# Patient Record
Sex: FEMALE | Race: WHITE | Employment: FULL TIME | URBAN - METROPOLITAN AREA
[De-identification: names, ages, dates, MRNs, and addresses within clinical notes are randomized per-mention and may not be internally consistent; named-entity substitution may affect disease eponyms.]

---

## 2017-09-26 ENCOUNTER — HOSPITAL ENCOUNTER (EMERGENCY)
Facility: HOSPITAL | Age: 42
Discharge: HOME | End: 2017-09-26
Payer: COMMERCIAL

## 2017-09-26 VITALS
SYSTOLIC BLOOD PRESSURE: 108 MMHG | BODY MASS INDEX: 27.6 KG/M2 | RESPIRATION RATE: 20 BRPM | WEIGHT: 150 LBS | DIASTOLIC BLOOD PRESSURE: 68 MMHG | TEMPERATURE: 99 F | HEIGHT: 62 IN | HEART RATE: 56 BPM | OXYGEN SATURATION: 97 %

## 2017-09-26 DIAGNOSIS — J30.9 ACUTE ALLERGIC RHINITIS, UNSPECIFIED SEASONALITY, UNSPECIFIED TRIGGER: Primary | ICD-10-CM

## 2017-09-26 DIAGNOSIS — L30.9 DERMATITIS: ICD-10-CM

## 2017-09-26 LAB — S PYO AG THROAT QL: NEGATIVE

## 2017-09-26 PROCEDURE — 99283 EMERGENCY DEPT VISIT LOW MDM: CPT

## 2017-09-26 PROCEDURE — 87430 STREP A AG IA: CPT

## 2017-09-26 NOTE — ED PROVIDER NOTES
Patient Seen in: [facility] Emergency Department    History   Patient presents with:  Rash Skin Problem (integumentary)    Stated Complaint: rash    HPI    38year old female, with a history of migraine headaches, presents to the emergency departm kg   LMP 09/25/2017   SpO2 97%   BMI 27.44 kg/m²         Physical Exam   Constitutional: She is oriented to person, place, and time. She appears well-developed and well-nourished. HENT:   Head: Normocephalic.    Right Ear: Tympanic membrane is injected (m at night with a topical hydrocortisone cream for the next 3 days if symptoms worsen she should follow-up with the primary care doctor or return to the ER    Disposition and Plan     Clinical Impression:  Acute allergic rhinitis, unspecified seasonality, un

## 2017-09-26 NOTE — ED INITIAL ASSESSMENT (HPI)
Pt states small red raised rash behind left ear, sore throat, and \"feeling of being tired\" since this AM. Denies fever.

## 2017-09-26 NOTE — ED NOTES
Patient reports having post-nasal drip, sore throat, swollen glands, and rash on neck since this morning. Bilateral tender and swollen gland palpated behind ears, under jaw and on neck.  Red, spotted rash noted to primarily the left side of neck and also to

## 2017-09-27 NOTE — ED NOTES
Patient is cleared for discharge per APN. Discharge instructions reviewed with patient including when and how to follow up with healthcare providers and when to seek emergency care.  Medications were reviewed for symptom management per APN request. Patient

## 2018-05-04 ENCOUNTER — TELEPHONE (OUTPATIENT)
Dept: OBGYN CLINIC | Facility: CLINIC | Age: 43
End: 2018-05-04

## 2018-05-04 NOTE — TELEPHONE ENCOUNTER
PER PT STATE SHE'S HAS A POSS CYST / ABD PAIN / CRAMPING / PT WOULD LIKE TO GET IN TO SEE DR / PLS ADV

## 2018-05-04 NOTE — TELEPHONE ENCOUNTER
Pt calling to report that she thinks she has another cyst and is in pain. Pt stated that she also has not had a period since the middle of February. Pt stated she has \"all the symptoms\" of her period but never bleeds. Pt stated that prior to February she would get monthly periods but they would be irregular and sometimes she should bleed every 2 weeks. Pt reports lower pelvic pain and back pain that she rates as 8/10. Pt stated she took 600mg ibuprofen at 7am and 600mg again around 10:45am and pt still rates pain as 8/10. Pt stated she has also tried heating pads. Pt informed that with pain that high it is recommended that she goes to the ER to be evaluated. Pt verbalized understanding. Pt instructed to call office tomorrow morning for a f/u.

## 2018-05-08 ENCOUNTER — OFFICE VISIT (OUTPATIENT)
Dept: OBGYN CLINIC | Facility: CLINIC | Age: 43
End: 2018-05-08

## 2018-05-08 ENCOUNTER — APPOINTMENT (OUTPATIENT)
Dept: LAB | Facility: HOSPITAL | Age: 43
End: 2018-05-08
Attending: OBSTETRICS & GYNECOLOGY
Payer: COMMERCIAL

## 2018-05-08 ENCOUNTER — TELEPHONE (OUTPATIENT)
Dept: OBGYN CLINIC | Facility: CLINIC | Age: 43
End: 2018-05-08

## 2018-05-08 VITALS
BODY MASS INDEX: 27 KG/M2 | HEART RATE: 59 BPM | WEIGHT: 150 LBS | DIASTOLIC BLOOD PRESSURE: 83 MMHG | SYSTOLIC BLOOD PRESSURE: 121 MMHG

## 2018-05-08 DIAGNOSIS — R10.2 PELVIC PAIN: Primary | ICD-10-CM

## 2018-05-08 DIAGNOSIS — R10.2 PELVIC PAIN: ICD-10-CM

## 2018-05-08 DIAGNOSIS — N73.0 PID (ACUTE PELVIC INFLAMMATORY DISEASE): ICD-10-CM

## 2018-05-08 PROCEDURE — 81001 URINALYSIS AUTO W/SCOPE: CPT

## 2018-05-08 PROCEDURE — 96372 THER/PROPH/DIAG INJ SC/IM: CPT | Performed by: OBSTETRICS & GYNECOLOGY

## 2018-05-08 PROCEDURE — 81025 URINE PREGNANCY TEST: CPT | Performed by: OBSTETRICS & GYNECOLOGY

## 2018-05-08 PROCEDURE — 99214 OFFICE O/P EST MOD 30 MIN: CPT | Performed by: OBSTETRICS & GYNECOLOGY

## 2018-05-08 RX ORDER — DOXYCYCLINE HYCLATE 100 MG
100 TABLET ORAL 2 TIMES DAILY
Qty: 28 TABLET | Refills: 0 | Status: SHIPPED | OUTPATIENT
Start: 2018-05-08 | End: 2018-05-22

## 2018-05-08 RX ORDER — CEFTRIAXONE SODIUM 250 MG/1
250 INJECTION, POWDER, FOR SOLUTION INTRAMUSCULAR; INTRAVENOUS ONCE
Status: COMPLETED | OUTPATIENT
Start: 2018-05-08 | End: 2018-05-08

## 2018-05-08 RX ADMIN — CEFTRIAXONE SODIUM 250 MG: 250 INJECTION, POWDER, FOR SOLUTION INTRAMUSCULAR; INTRAVENOUS at 14:20:00

## 2018-05-08 NOTE — H&P
HPI:  The patient is a 44-year-old female who presents complaining of irregular menses, pelvic pain, cramping and spotting. The patient states her LMP is 2/18/2018. For the last 2 weeks she has noted an increase discomfort in her lower abdomen.   She ha Pt has a pacemaker No    Pt has a defibrillator No    Reaction to local anesthetic No     Social History Narrative   None on file       FAMILY HISTORY:  Family History   Problem Relation Age of Onset   • Hypertension Mother    • Deonna Desouza Mother today for gyn exam.    Diagnoses and all orders for this visit:    Pelvic pain  -     URINALYSIS WITH CULTURE REFLEX; Future  -     URINE PREGNANCY TEST  -     GENITAL VAGINOSIS SCREEN; Future  -     CHLAMYDIA/GONOCOCCUS, MARY LOU;  Future  -     GENITAL VAGINOS

## 2018-05-08 NOTE — TELEPHONE ENCOUNTER
Pt was told to schedule a f/u with arnoldo. Pt scheduled for June 7 at 6:30. She as an appt currently on June 6 with dr. Angella Huff. Wanted to know if dr. Angella Huff could do her f/u or if she needs to see arnoldo.

## 2018-05-08 NOTE — PROGRESS NOTES
Rocephin 250 mg IM administered to pt's Left Upper Outer Gluteus. Pt tolerated well. Encouraged pt to call clinic with questions and concerns.

## 2018-05-10 ENCOUNTER — TELEPHONE (OUTPATIENT)
Dept: PEDIATRICS CLINIC | Facility: CLINIC | Age: 43
End: 2018-05-10

## 2018-05-10 RX ORDER — FLUCONAZOLE 150 MG/1
150 TABLET ORAL DAILY
Qty: 2 TABLET | Refills: 0 | Status: SHIPPED | OUTPATIENT
Start: 2018-05-10 | End: 2018-05-12

## 2018-05-10 NOTE — TELEPHONE ENCOUNTER
----- Message from Hyun Reza DO sent at 5/10/2018  2:53 PM CDT -----  Needs diflucan. Have her take at the end of finish her PID antibiotics.

## 2018-05-10 NOTE — TELEPHONE ENCOUNTER
States has not notice any improvement with treatment. Rates pain now 5/10. States before starting tx pain was 6/10.   States \"last night pain was so bad that almost went to [employer] States it woke her up from her sleep and was up from 1-4am.  Took 3 Aleve cap

## 2018-05-10 NOTE — TELEPHONE ENCOUNTER
Ref Range & Units 5/8/18  2:24 PM   Genital vaginosis screen Negative Negative    Trichomonas screen Negative Negative    Candida Screen Negative for Candida 2+ Candida albicans

## 2018-05-10 NOTE — TELEPHONE ENCOUNTER
Rx order reviewed with LISA while in the office. Per LISA pt to take Diflucan 150mg as per below and repeat second dose 72 hrs after the first one. Also ask pt how her pain is. Pt advised of result and recs per below and states understanding.   Call dropped

## 2018-05-11 NOTE — TELEPHONE ENCOUNTER
Need to give antibiotics some time to work, if by early next week still having pain, then we may need to get an US to ensure no masses
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
## 2018-05-22 ENCOUNTER — TELEPHONE (OUTPATIENT)
Dept: OBGYN CLINIC | Facility: CLINIC | Age: 43
End: 2018-05-22

## 2018-05-22 DIAGNOSIS — R10.2 PELVIC PAIN: Primary | ICD-10-CM

## 2018-05-22 NOTE — TELEPHONE ENCOUNTER
Per the pt she just finished an antibiotic prescribed by Dr Azar Speak, and is not felling any better. The would like to speak with a doctor or nurse about her symptoms. Please advise.

## 2018-05-23 NOTE — TELEPHONE ENCOUNTER
Pt reports today she completed antibiotics (doxycycline) prescribed by Iva Lua on 5/8/18 for PID and pt is still having pelvic pain 5-6/10.  Pt states she had some pain relief when she was taking Aleve 660 mg around the clock about four times daily then she dec

## 2018-05-24 ENCOUNTER — HOSPITAL ENCOUNTER (OUTPATIENT)
Dept: ULTRASOUND IMAGING | Facility: HOSPITAL | Age: 43
Discharge: HOME | End: 2018-05-24
Attending: OBSTETRICS & GYNECOLOGY
Payer: COMMERCIAL

## 2018-05-24 DIAGNOSIS — R10.2 PELVIC PAIN: ICD-10-CM

## 2018-05-24 PROCEDURE — 76830 TRANSVAGINAL US NON-OB: CPT | Performed by: OBSTETRICS & GYNECOLOGY

## 2018-05-24 PROCEDURE — 76856 US EXAM PELVIC COMPLETE: CPT | Performed by: OBSTETRICS & GYNECOLOGY

## 2018-05-25 ENCOUNTER — TELEPHONE (OUTPATIENT)
Dept: OBGYN CLINIC | Facility: CLINIC | Age: 43
End: 2018-05-25

## 2018-05-25 ENCOUNTER — OFFICE VISIT (OUTPATIENT)
Dept: FAMILY MEDICINE CLINIC | Facility: CLINIC | Age: 43
End: 2018-05-25

## 2018-05-25 VITALS
BODY MASS INDEX: 25.44 KG/M2 | OXYGEN SATURATION: 98 % | DIASTOLIC BLOOD PRESSURE: 76 MMHG | SYSTOLIC BLOOD PRESSURE: 100 MMHG | WEIGHT: 149 LBS | HEART RATE: 64 BPM | HEIGHT: 64 IN

## 2018-05-25 DIAGNOSIS — N83.202 CYST OF LEFT OVARY: Primary | ICD-10-CM

## 2018-05-25 DIAGNOSIS — B35.3 TINEA PEDIS OF BOTH FEET: ICD-10-CM

## 2018-05-25 DIAGNOSIS — G89.29 CHRONIC RLQ PAIN: Primary | ICD-10-CM

## 2018-05-25 DIAGNOSIS — R10.31 CHRONIC RLQ PAIN: Primary | ICD-10-CM

## 2018-05-25 PROCEDURE — 80053 COMPREHEN METABOLIC PANEL: CPT | Performed by: FAMILY MEDICINE

## 2018-05-25 PROCEDURE — 99203 OFFICE O/P NEW LOW 30 MIN: CPT | Performed by: FAMILY MEDICINE

## 2018-05-25 PROCEDURE — 85027 COMPLETE CBC AUTOMATED: CPT | Performed by: FAMILY MEDICINE

## 2018-05-25 RX ORDER — HYDROCODONE BITARTRATE AND ACETAMINOPHEN 5; 325 MG/1; MG/1
1 TABLET ORAL EVERY 12 HOURS PRN
Qty: 20 TABLET | Refills: 0 | Status: SHIPPED | OUTPATIENT
Start: 2018-05-25 | End: 2018-07-21

## 2018-05-25 NOTE — TELEPHONE ENCOUNTER
Reviewed pelvic US. Patient with 4 cm cyst on her left ovary and a small amount of free fluid in the pelvis. The cyst isn't big enough to be the source of her pain.   If her pain is still significant, she may benefit from seeing her PCP to ensure no concer

## 2018-05-25 NOTE — PROGRESS NOTES
HPI:    Patient ID: Arjun Marie is a 41year old female.     For past 4 weeks having RLQ>LLQ pain that radiates to lower back and occasionally to right leg   Taking ibuprofen 600mg ATC  Gradually worsening  Hard to walk  Having nausea  No vomiting, BM santana Oropharynx is clear and moist and mucous membranes are normal.   Eyes: Conjunctivae are normal. Pupils are equal, round, and reactive to light. Neck: Neck supple. Cardiovascular: Normal rate, regular rhythm and normal heart sounds. No murmur heard.

## 2018-05-25 NOTE — TELEPHONE ENCOUNTER
Informed pt that Iva Lua stated pt with 4 cm cyst on her left ovary and a small amount of free fluid in the pelvis. Informed pt that cyst is not big enough to be the source of her pain.  Informed her that Iva Lua stated if her pain is still significant, she may elvira

## 2018-05-31 ENCOUNTER — HOSPITAL ENCOUNTER (OUTPATIENT)
Dept: CT IMAGING | Facility: HOSPITAL | Age: 43
Discharge: HOME | End: 2018-05-31
Attending: FAMILY MEDICINE
Payer: COMMERCIAL

## 2018-05-31 DIAGNOSIS — R10.31 CHRONIC RLQ PAIN: ICD-10-CM

## 2018-05-31 DIAGNOSIS — G89.29 CHRONIC RLQ PAIN: ICD-10-CM

## 2018-05-31 PROCEDURE — 74177 CT ABD & PELVIS W/CONTRAST: CPT | Performed by: FAMILY MEDICINE

## 2018-06-04 ENCOUNTER — TELEPHONE (OUTPATIENT)
Dept: FAMILY MEDICINE CLINIC | Facility: CLINIC | Age: 43
End: 2018-06-04

## 2018-06-06 ENCOUNTER — OFFICE VISIT (OUTPATIENT)
Dept: OBGYN CLINIC | Facility: CLINIC | Age: 43
End: 2018-06-06

## 2018-06-06 ENCOUNTER — TELEPHONE (OUTPATIENT)
Dept: FAMILY MEDICINE CLINIC | Facility: CLINIC | Age: 43
End: 2018-06-06

## 2018-06-06 VITALS
WEIGHT: 150 LBS | SYSTOLIC BLOOD PRESSURE: 117 MMHG | BODY MASS INDEX: 26 KG/M2 | DIASTOLIC BLOOD PRESSURE: 81 MMHG | HEART RATE: 64 BPM

## 2018-06-06 DIAGNOSIS — N94.6 MENSES PAINFUL: ICD-10-CM

## 2018-06-06 DIAGNOSIS — Z01.419 ENCOUNTER FOR GYNECOLOGICAL EXAMINATION WITHOUT ABNORMAL FINDING: Primary | ICD-10-CM

## 2018-06-06 DIAGNOSIS — Z12.4 SCREENING FOR MALIGNANT NEOPLASM OF CERVIX: ICD-10-CM

## 2018-06-06 DIAGNOSIS — R10.2 PELVIC PAIN IN FEMALE: ICD-10-CM

## 2018-06-06 PROCEDURE — 99396 PREV VISIT EST AGE 40-64: CPT | Performed by: OBSTETRICS & GYNECOLOGY

## 2018-06-06 PROCEDURE — 99213 OFFICE O/P EST LOW 20 MIN: CPT | Performed by: OBSTETRICS & GYNECOLOGY

## 2018-06-06 RX ORDER — ACETAMINOPHEN AND CODEINE PHOSPHATE 120; 12 MG/5ML; MG/5ML
0.35 SOLUTION ORAL DAILY
Qty: 3 PACKAGE | Refills: 3 | Status: SHIPPED | OUTPATIENT
Start: 2018-06-06 | End: 2018-12-12

## 2018-06-06 NOTE — TELEPHONE ENCOUNTER
Patient received ct results from Dr. Ji Lewis and was told to follow up with ob gyn. Patient went to go see her ob but states she told her something different and is currently confused as to what she has or needs to do next.  She wants to speak to Dr. Kami Parnell

## 2018-06-06 NOTE — PROGRESS NOTES
Carly Meyers is a 41year old female [id number] Patient's last menstrual period was 05/28/2018. Patient presents with:  Gyn Exam: ANNUAL  She c/o right sided pelvic pain. She is seeing Dr. Markell Valdes for this.   Patient states that she had no menses from [facility]       GYNE HISTORY:   Menarche: 8years old  Period Cycle (Days): Irregular- Not getting period since 6/2016  Use of Birth Control (if yes, specify type): Vasectomy  Pap Date: 08/05/13  Pap Result Notes: Pap-Negative, Hpv-Negative, [facility] 8/18/14 0    ALLERGIES:    Latex                   RASH      Review of Systems:  Constitutional:  Denies fatigue, night sweats, hot flashes  Eyes:  denies blurred or double vision  Cardiovascular:  denies chest pain or palpitations  Respiratory:  denies shortness Normal without tenderness on motion  Uterus: normal in size, contour, position, mobility, without tenderness  Adnexa: normal without masses or tenderness  Perineum: normal  Anus: no hemorroids     Assessment & Plan:    Encounter for gynecological examinati CT chest: Marked narrowing at the confluence of the right subclavian and right internal jugular veins by an enlarged multinodular goiter with resultant subcutaneous edema in the right chest wall and right arm and formation of anterior chest wall collaterals. Bilateral pulmonary nodules measuring up to 4 mm. Age-indeterminate compression fracture deformities of the L2 and L5 vertebral bodies.

## 2018-06-21 NOTE — PROGRESS NOTES
Neg pap, HPV neg, repeat pap needed in 3 years, return to clinic 1 year for annual exam,Letter Sent via mail.

## 2018-07-05 ENCOUNTER — HOSPITAL ENCOUNTER (OUTPATIENT)
Dept: ULTRASOUND IMAGING | Facility: HOSPITAL | Age: 43
Discharge: HOME | End: 2018-07-05
Attending: OBSTETRICS & GYNECOLOGY
Payer: COMMERCIAL

## 2018-07-05 DIAGNOSIS — N83.202 CYST OF LEFT OVARY: ICD-10-CM

## 2018-07-05 PROCEDURE — 76830 TRANSVAGINAL US NON-OB: CPT | Performed by: OBSTETRICS & GYNECOLOGY

## 2018-07-05 PROCEDURE — 76856 US EXAM PELVIC COMPLETE: CPT | Performed by: OBSTETRICS & GYNECOLOGY

## 2018-07-06 ENCOUNTER — TELEPHONE (OUTPATIENT)
Dept: FAMILY MEDICINE CLINIC | Facility: CLINIC | Age: 43
End: 2018-07-06

## 2018-07-21 ENCOUNTER — OFFICE VISIT (OUTPATIENT)
Dept: FAMILY MEDICINE CLINIC | Facility: CLINIC | Age: 43
End: 2018-07-21
Payer: COMMERCIAL

## 2018-07-21 VITALS
WEIGHT: 155 LBS | HEART RATE: 58 BPM | OXYGEN SATURATION: 98 % | BODY MASS INDEX: 27 KG/M2 | SYSTOLIC BLOOD PRESSURE: 110 MMHG | DIASTOLIC BLOOD PRESSURE: 70 MMHG

## 2018-07-21 DIAGNOSIS — R29.898 WEAKNESS OF RIGHT LOWER EXTREMITY: ICD-10-CM

## 2018-07-21 DIAGNOSIS — G89.29 CHRONIC RLQ PAIN: Primary | ICD-10-CM

## 2018-07-21 DIAGNOSIS — R10.31 CHRONIC RLQ PAIN: Primary | ICD-10-CM

## 2018-07-21 DIAGNOSIS — M54.16 LUMBAR RADICULOPATHY, CHRONIC: ICD-10-CM

## 2018-07-21 PROCEDURE — 99213 OFFICE O/P EST LOW 20 MIN: CPT | Performed by: FAMILY MEDICINE

## 2018-07-21 RX ORDER — HYDROCODONE BITARTRATE AND ACETAMINOPHEN 5; 325 MG/1; MG/1
1 TABLET ORAL EVERY 12 HOURS PRN
Qty: 30 TABLET | Refills: 0 | Status: SHIPPED | OUTPATIENT
Start: 2018-07-21 | End: 2018-12-12

## 2018-07-26 ENCOUNTER — HOSPITAL ENCOUNTER (OUTPATIENT)
Dept: MRI IMAGING | Facility: HOSPITAL | Age: 43
Discharge: HOME | End: 2018-07-26
Attending: PHYSICIAN ASSISTANT
Payer: COMMERCIAL

## 2018-07-26 DIAGNOSIS — R10.2 PELVIC PAIN: ICD-10-CM

## 2018-07-26 PROCEDURE — 72197 MRI PELVIS W/O & W/DYE: CPT | Performed by: PHYSICIAN ASSISTANT

## 2018-07-26 PROCEDURE — A9576 INJ PROHANCE MULTIPACK: HCPCS | Performed by: RADIOLOGY

## 2018-07-26 NOTE — PROGRESS NOTES
HPI:    Patient ID: Carly Meyers is a 41year old female. For past 2-3mo has continue to have RLQ pain that radiates to lower back and down right leg  w/ numbness and tingling.  Has also noticed increased bruising in right thigh  Taking ibuprofen 600mg A affected area once daily Disp: 15 g Rfl: 1     Allergies:  Latex                   RASH   PHYSICAL EXAM:   Physical Exam   Vitals reviewed. Constitutional: She appears well-developed and well-nourished. No distress.    HENT:   Head: Normocephalic and atra orders of the defined types were placed in this encounter. Meds This Visit:  Signed Prescriptions Disp Refills    HYDROcodone-acetaminophen 5-325 MG Oral Tab 30 tablet 0      Sig: Take 1 tablet by mouth every 12 (twelve) hours as needed for Pain.

## 2018-07-27 ENCOUNTER — HOSPITAL ENCOUNTER (OUTPATIENT)
Dept: MRI IMAGING | Facility: HOSPITAL | Age: 43
Discharge: HOME | End: 2018-07-27
Attending: FAMILY MEDICINE
Payer: COMMERCIAL

## 2018-07-27 DIAGNOSIS — R29.898 WEAKNESS OF RIGHT LOWER EXTREMITY: ICD-10-CM

## 2018-07-27 DIAGNOSIS — M54.16 LUMBAR RADICULOPATHY, CHRONIC: ICD-10-CM

## 2018-07-27 PROCEDURE — 72148 MRI LUMBAR SPINE W/O DYE: CPT | Performed by: FAMILY MEDICINE

## 2018-08-01 ENCOUNTER — OFFICE VISIT (OUTPATIENT)
Dept: FAMILY MEDICINE CLINIC | Facility: CLINIC | Age: 43
End: 2018-08-01
Payer: COMMERCIAL

## 2018-08-01 VITALS
BODY MASS INDEX: 27 KG/M2 | SYSTOLIC BLOOD PRESSURE: 102 MMHG | DIASTOLIC BLOOD PRESSURE: 70 MMHG | OXYGEN SATURATION: 98 % | WEIGHT: 158 LBS | HEART RATE: 62 BPM

## 2018-08-01 DIAGNOSIS — M51.26 L4-L5 DISC BULGE: ICD-10-CM

## 2018-08-01 DIAGNOSIS — G89.29 CHRONIC RLQ PAIN: ICD-10-CM

## 2018-08-01 DIAGNOSIS — M47.816 OSTEOARTHRITIS OF LUMBAR SPINE, UNSPECIFIED SPINAL OSTEOARTHRITIS COMPLICATION STATUS: ICD-10-CM

## 2018-08-01 DIAGNOSIS — M54.16 LUMBAR RADICULOPATHY, CHRONIC: Primary | ICD-10-CM

## 2018-08-01 DIAGNOSIS — R10.31 CHRONIC RLQ PAIN: ICD-10-CM

## 2018-08-01 DIAGNOSIS — R10.2 PELVIC PAIN IN FEMALE: ICD-10-CM

## 2018-08-01 PROCEDURE — 99213 OFFICE O/P EST LOW 20 MIN: CPT | Performed by: FAMILY MEDICINE

## 2018-08-04 PROBLEM — R10.2 PELVIC PAIN IN FEMALE: Status: ACTIVE | Noted: 2018-08-04

## 2018-08-04 PROBLEM — M51.369 L4-L5 DISC BULGE: Status: ACTIVE | Noted: 2018-08-04

## 2018-08-04 PROBLEM — M51.36 L4-L5 DISC BULGE: Status: ACTIVE | Noted: 2018-08-04

## 2018-08-04 PROBLEM — M51.26 L4-L5 DISC BULGE: Status: ACTIVE | Noted: 2018-08-04

## 2018-08-04 NOTE — PROGRESS NOTES
HPI:    Patient ID: Norma Koehler is a 41year old female. For past 3-4mo having Right pelvic pain/RLQ pain that radiates to lower back and down right leg w/ numbness and tingling.    Taking ibuprofen 600mg ATC and Norco PRN  No nausea, vomiting, BM veronica Allergies:  Latex                   RASH   PHYSICAL EXAM:   Physical Exam   Vitals reviewed. Constitutional: She appears well-developed and well-nourished. No distress. HENT:   Head: Normocephalic and atraumatic.    Mouth/Throat: Oropharynx is clear physical in 3mo  Reasurrance and education provided. All questions answered. Red flags/ ER precautions discussed. No orders of the defined types were placed in this encounter.       Meds This Visit:  No prescriptions requested or ordered in this encounte

## 2018-08-06 ENCOUNTER — OFFICE VISIT (OUTPATIENT)
Dept: PAIN CLINIC | Facility: HOSPITAL | Age: 43
End: 2018-08-06
Attending: FAMILY MEDICINE
Payer: COMMERCIAL

## 2018-08-06 ENCOUNTER — DOCUMENTATION ONLY (OUTPATIENT)
Dept: PAIN CLINIC | Facility: HOSPITAL | Age: 43
End: 2018-08-06

## 2018-08-06 VITALS
HEIGHT: 64 IN | SYSTOLIC BLOOD PRESSURE: 100 MMHG | DIASTOLIC BLOOD PRESSURE: 70 MMHG | RESPIRATION RATE: 18 BRPM | BODY MASS INDEX: 26.98 KG/M2 | WEIGHT: 158 LBS

## 2018-08-06 DIAGNOSIS — M79.10 MYALGIA: Chronic | ICD-10-CM

## 2018-08-06 DIAGNOSIS — R10.31 CHRONIC RLQ PAIN: ICD-10-CM

## 2018-08-06 DIAGNOSIS — G89.29 CHRONIC RLQ PAIN: ICD-10-CM

## 2018-08-06 DIAGNOSIS — M54.16 LUMBAR RADICULOPATHY, CHRONIC: Primary | ICD-10-CM

## 2018-08-06 DIAGNOSIS — R29.898 WEAKNESS OF RIGHT LOWER EXTREMITY: ICD-10-CM

## 2018-08-06 DIAGNOSIS — M51.26 L4-L5 DISC BULGE: ICD-10-CM

## 2018-08-06 PROCEDURE — 99203 OFFICE O/P NEW LOW 30 MIN: CPT

## 2018-08-06 RX ORDER — METHYLPREDNISOLONE ACETATE 40 MG/ML
40 INJECTION, SUSPENSION INTRA-ARTICULAR; INTRALESIONAL; INTRAMUSCULAR; SOFT TISSUE ONCE
Status: COMPLETED | OUTPATIENT
Start: 2018-08-06 | End: 2018-08-06

## 2018-08-06 RX ORDER — BUPIVACAINE HYDROCHLORIDE 2.5 MG/ML
10 INJECTION, SOLUTION EPIDURAL; INFILTRATION; INTRACAUDAL ONCE
Status: COMPLETED | OUTPATIENT
Start: 2018-08-06 | End: 2018-08-06

## 2018-08-06 RX ADMIN — METHYLPREDNISOLONE ACETATE 40 MG: 40 INJECTION, SUSPENSION INTRA-ARTICULAR; INTRALESIONAL; INTRAMUSCULAR; SOFT TISSUE at 10:06:00

## 2018-08-06 RX ADMIN — BUPIVACAINE HYDROCHLORIDE 10 ML: 2.5 INJECTION, SOLUTION EPIDURAL; INFILTRATION; INTRACAUDAL at 10:05:00

## 2018-08-06 NOTE — CHRONIC PAIN
[facility]  Pain Clinic   New Consult       Patient name: Vince Callahan 41year old female  : 1975  MRN: [id number]  Referring MD: Gustavo Ayon    COMPLAINT:  Referred to the pain clinic by Dr. Reji Mcdaniel for low amy Outpatient Prescriptions:   •  HYDROcodone-acetaminophen 5-325 MG Oral Tab, Take 1 tablet by mouth every 12 (twelve) hours as needed for Pain., Disp: 30 tablet, Rfl: 0  •  Norethindrone (MANJIT-BE) 0.35 MG Oral Tab, Take 1 tablet (0.35 mg total) by mouth star Normal  Left Lower Extremity: Normal  SLR: Negative  SIJ tenderness: Positive on the right  Kristen's test: Negative bilaterally  TPs:  Present Right buttock area    IMAGING:  LUMBAR DISC LEVELS:  L1-L2:   There is suggestion of minimal, early facet arthrosi with alcohol swabs. 2 trigger points were injected into right gluteus ralph area. Total 10 cc of 0.5% bupivacaine with the 40 mg of Depo-Medrol injected. Tolerated procedure well discharge instructions given.   Comprehensive analgesic plan was formulat

## 2018-08-07 ENCOUNTER — DOCUMENTATION ONLY (OUTPATIENT)
Dept: PAIN CLINIC | Facility: HOSPITAL | Age: 43
End: 2018-08-07

## 2018-09-05 ENCOUNTER — OFFICE VISIT (OUTPATIENT)
Dept: PAIN CLINIC | Facility: HOSPITAL | Age: 43
End: 2018-09-05
Attending: ANESTHESIOLOGY
Payer: COMMERCIAL

## 2018-09-05 VITALS
WEIGHT: 158 LBS | HEIGHT: 64 IN | HEART RATE: 64 BPM | DIASTOLIC BLOOD PRESSURE: 67 MMHG | RESPIRATION RATE: 18 BRPM | BODY MASS INDEX: 26.98 KG/M2 | SYSTOLIC BLOOD PRESSURE: 108 MMHG

## 2018-09-05 DIAGNOSIS — M54.16 LUMBAR RADICULOPATHY, CHRONIC: Primary | ICD-10-CM

## 2018-09-05 PROCEDURE — 99211 OFF/OP EST MAY X REQ PHY/QHP: CPT

## 2018-09-05 NOTE — PROGRESS NOTES
INITIAL CONSULT:  08/06/18  PRESENTS AMBULATORY TO [facility]; ACCOMPANIED BY HER ;  NEW CONSULT C/O OF RT PELVIC PAIN RADIATING DOWN THE RLE; RATES 4/10;  NC STATES THE PAIN BEGAN IN FEB.  AND HAS INCREASED OVERTIME;  \"IT FEEL LIKE SOME ONE IS SQUEEZING M

## 2018-09-05 NOTE — CHRONIC PAIN
Follow-up Note    HISTORY OF PRESENT ILLNESS:  Alton Mera is a 41year old old female, returns to the clinic for evaluation treatment of her low back pain which radiates onto her right buttock and and thigh Naomiолег Romero had a epidural injection right L4-5 bird SINCE FEB/ 2018       SURGICAL HISTORY:  Past Surgical History:  No date: LAPAROSCOPY,DIAGNOSTIC    FAMILY HISTORY:  Family History   Problem Relation Age of Onset   • Hypertension Mother    • Lipids Mother      hyperlipidemia   • Other Drucella Tae Mother Sensation (light touch/pinprick/temperature):   Right Lower Extremity:  Normal  Left Lower Extremity:  Normal  Right Upper Extremity: Normal  Left Upper Extremity: Normal  Edema - Absent    SLR: negative   SIJ tenderness:  negative     TPs: Present:

## 2018-09-10 ENCOUNTER — DOCUMENTATION ONLY (OUTPATIENT)
Dept: PAIN CLINIC | Facility: HOSPITAL | Age: 43
End: 2018-09-10

## 2018-10-05 ENCOUNTER — OFFICE VISIT (OUTPATIENT)
Dept: PAIN CLINIC | Facility: HOSPITAL | Age: 43
End: 2018-10-05
Attending: ANESTHESIOLOGY
Payer: COMMERCIAL

## 2018-10-05 DIAGNOSIS — M51.26 L4-L5 DISC BULGE: Primary | ICD-10-CM

## 2018-10-05 PROCEDURE — 99211 OFF/OP EST MAY X REQ PHY/QHP: CPT

## 2018-10-05 NOTE — CHRONIC PAIN
Follow-up Note  CC: S/P Right  Trans KAIN 9/21/18  HISTORY OF PRESENT ILLNESS:  Rock Moon is a 41year old old female, originally referred to the pain clinic by Dr. Amarilis Dominguez, with history of L4-L5 disc bulge  (primary encounter diagnosis) returns to the female     L4-L5 disc bulge     Myalgia    Past Medical History:   Diagnosis Date   • Exposure to genital herpes    • Migraine    • Pelvic pain in female     SINCE FEB/ 2018       SURGICAL HISTORY:  Past Surgical History:  No date: LAPAROSCOPY,DIAGNOSTIC Bike Helmet: Not Asked        Seat Belt: Not Asked        Self-Exams: Not Asked        Grew up on a farm: Not Asked        History of tanning: Not Asked        Outdoor occupation: Not Asked        Pt has a pacemaker: No        Pt has a defibrillator: N length including pharmacotherapy (eg. Anti- inflammatories, muscle relaxants, neuropathic medications, oral steroids, analgesics), injections, and further testing.  Risks and benefits of all options were discussed at length to patients satisfaction during a

## 2018-10-05 NOTE — PROGRESS NOTES
Patient presents to [facility] ambulatory for follow up right trans epidural steroid injection done 9-21. She reports 70% relief and is happy with the results. She has not needed to take norco since the injection.   She continues to have numbness and tingling in

## 2018-12-12 ENCOUNTER — OFFICE VISIT (OUTPATIENT)
Dept: FAMILY MEDICINE CLINIC | Facility: CLINIC | Age: 43
End: 2018-12-12
Payer: COMMERCIAL

## 2018-12-12 VITALS
OXYGEN SATURATION: 99 % | HEIGHT: 64 IN | HEART RATE: 75 BPM | BODY MASS INDEX: 26.8 KG/M2 | DIASTOLIC BLOOD PRESSURE: 84 MMHG | WEIGHT: 157 LBS | SYSTOLIC BLOOD PRESSURE: 138 MMHG

## 2018-12-12 DIAGNOSIS — R10.2 PELVIC PAIN IN FEMALE: Primary | ICD-10-CM

## 2018-12-12 DIAGNOSIS — G89.29 CHRONIC RLQ PAIN: ICD-10-CM

## 2018-12-12 DIAGNOSIS — M54.16 LUMBAR RADICULOPATHY, CHRONIC: ICD-10-CM

## 2018-12-12 DIAGNOSIS — M51.26 L4-L5 DISC BULGE: ICD-10-CM

## 2018-12-12 DIAGNOSIS — R10.31 CHRONIC RLQ PAIN: ICD-10-CM

## 2018-12-12 PROCEDURE — 99214 OFFICE O/P EST MOD 30 MIN: CPT | Performed by: FAMILY MEDICINE

## 2018-12-12 PROCEDURE — 96372 THER/PROPH/DIAG INJ SC/IM: CPT | Performed by: FAMILY MEDICINE

## 2018-12-12 RX ORDER — KETOROLAC TROMETHAMINE 30 MG/ML
30 INJECTION, SOLUTION INTRAMUSCULAR; INTRAVENOUS ONCE
Status: COMPLETED | OUTPATIENT
Start: 2018-12-12 | End: 2018-12-12

## 2018-12-12 RX ORDER — NAPROXEN 500 MG/1
500 TABLET ORAL 2 TIMES DAILY WITH MEALS
COMMUNITY
End: 2019-07-31

## 2018-12-12 RX ORDER — HYDROCODONE BITARTRATE AND ACETAMINOPHEN 10; 325 MG/1; MG/1
1 TABLET ORAL EVERY 12 HOURS PRN
Qty: 30 TABLET | Refills: 0 | Status: SHIPPED | OUTPATIENT
Start: 2018-12-12 | End: 2019-07-24

## 2018-12-12 RX ADMIN — KETOROLAC TROMETHAMINE 30 MG: 30 INJECTION, SOLUTION INTRAMUSCULAR; INTRAVENOUS at 19:25:00

## 2018-12-13 NOTE — PROGRESS NOTES
HPI:   Patient presents with:  Pelvic Pain: c/c pelvic pain x2 weeks       Ebb Sheryl is a 41year old female presenting for:  Pain  -for past 6mo having Right pelvic pain/RLQ pain that radiates to lower back and down right leg w/ numbness and tfngling Authorizing Provider:  Lizbeth Linn MD        Collected:           06/06/2018 09:31 AM          Ordering Location:     [facility] for Received:            06/06/2018 05:53 PM                                 Health, [address], No     Family History:  Family History   Problem Relation Age of Onset   • Hypertension Mother    • Lipids Mother         hyperlipidemia   • Other (Other) Mother    • Cancer Cousin         leukemia   • Cancer Paternal Uncle         unsure   • Stroke Matern in office  -continue gyne and pain f/u.   -if no improvement may need nerve block   - PHYSICAL THERAPY - INTERNAL      Follow-up in 1mo  Patient indicates understanding of the above recommendations and agrees to the above plan.   Reasurrance and education p

## 2018-12-14 PROBLEM — R10.31 CHRONIC RLQ PAIN: Status: ACTIVE | Noted: 2018-12-14

## 2018-12-14 PROBLEM — G89.29 CHRONIC RLQ PAIN: Status: ACTIVE | Noted: 2018-12-14

## 2018-12-14 PROBLEM — M54.16 LUMBAR RADICULOPATHY, CHRONIC: Status: ACTIVE | Noted: 2018-12-14

## 2019-02-22 ENCOUNTER — OFFICE VISIT (OUTPATIENT)
Dept: FAMILY MEDICINE CLINIC | Facility: CLINIC | Age: 44
End: 2019-02-22
Payer: COMMERCIAL

## 2019-02-22 VITALS
RESPIRATION RATE: 14 BRPM | SYSTOLIC BLOOD PRESSURE: 126 MMHG | OXYGEN SATURATION: 98 % | HEIGHT: 64 IN | TEMPERATURE: 98 F | HEART RATE: 66 BPM | DIASTOLIC BLOOD PRESSURE: 84 MMHG | WEIGHT: 160 LBS | BODY MASS INDEX: 27.31 KG/M2

## 2019-02-22 DIAGNOSIS — H57.13 PAIN OF BOTH EYES: ICD-10-CM

## 2019-02-22 DIAGNOSIS — H57.89 REDNESS OF BOTH EYES: ICD-10-CM

## 2019-02-22 DIAGNOSIS — H53.143 PHOTOPHOBIA OF BOTH EYES: Primary | ICD-10-CM

## 2019-02-22 PROCEDURE — 99213 OFFICE O/P EST LOW 20 MIN: CPT | Performed by: FAMILY MEDICINE

## 2019-02-22 NOTE — PROGRESS NOTES
HPI:   Patient presents with:  Eye Pain: c/c redness and pain on both eyes since yesterday      Judi Saucedo is a 45year old female presenting for:  Eye discomfort  -yesterday developed redness in b/l eyes along with eye pain and discomfort w/ eye movem Health, [address] [place] [address]                                                                     First Screen:          Gera Pizarro • Cancer Paternal Uncle         unsure   • Stroke Maternal Grandmother    • Stroke Maternal Grandfather    • No Known Problems Father    • No Known Problems Daughter    • No Known Problems Son    • Obesity Sister    • Other (Other) Brother         divirt keratitis based on sx and exam  -discussed w/ opthalmologist clinic here at [place] ([address] at [facility]).  Office will be able to see her right now with the ophthalmologist  -pt sent to optho clinic immediately      Follow-up PRN  Patient indicates Teressa

## 2019-05-09 ENCOUNTER — TELEPHONE (OUTPATIENT)
Dept: OBGYN CLINIC | Facility: CLINIC | Age: 44
End: 2019-05-09

## 2019-05-09 ENCOUNTER — APPOINTMENT (OUTPATIENT)
Dept: LAB | Facility: HOSPITAL | Age: 44
End: 2019-05-09
Attending: OBSTETRICS & GYNECOLOGY
Payer: COMMERCIAL

## 2019-05-09 DIAGNOSIS — R35.0 URINARY FREQUENCY: ICD-10-CM

## 2019-05-09 DIAGNOSIS — R30.9 PAINFUL URINATION: ICD-10-CM

## 2019-05-09 DIAGNOSIS — R39.15 URGENCY OF URINATION: ICD-10-CM

## 2019-05-09 DIAGNOSIS — R10.9 ABDOMINAL PRESSURE: Primary | ICD-10-CM

## 2019-05-09 DIAGNOSIS — R10.9 ABDOMINAL PRESSURE: ICD-10-CM

## 2019-05-09 PROCEDURE — 87086 URINE CULTURE/COLONY COUNT: CPT

## 2019-05-09 PROCEDURE — 81003 URINALYSIS AUTO W/O SCOPE: CPT

## 2019-05-09 NOTE — TELEPHONE ENCOUNTER
C/O LOW ABDOMINAL ACHE, PAIN AT THE END OF URINARY STREAM, URINARY URGENCY, SOMETIMES SMALL AMOUNTS AND FREQUENCY. ADVISED TO COME FOR UA. ORDER PLACED. ASKED HER TO CALL IN THE MORNING FOR THE RESULT.

## 2019-05-10 NOTE — TELEPHONE ENCOUNTER
UA not c/w a UTI, I would refer her to urology- Dr Doreen Mata- to determine if there is a noninfectious cause for her bladder symptoms.   PLease ask of possible to run a urine culture on her sample since there are a few bacteria but no other indicators of a UTI

## 2019-05-14 NOTE — TELEPHONE ENCOUNTER
Informed pt that the urine culture shows no growth at 18-24 hours. Informed pt that CAP wanted pt to see Dr. Franca Haile to see if there is a noninfectious cause for bladder symptoms.       Pt wanted CAP to know that she was taking a new vitamin \"Hum\" and sto

## 2019-05-15 NOTE — TELEPHONE ENCOUNTER
PT NOTIFIED OF RECS AND VERBALIZED UNDERSTANDING. OFFERED NAMES AND PHONE NUMBERS OF DR. Jayesh AQUINO.

## 2019-05-17 ENCOUNTER — TELEPHONE (OUTPATIENT)
Dept: OBGYN CLINIC | Facility: CLINIC | Age: 44
End: 2019-05-17

## 2019-05-17 NOTE — TELEPHONE ENCOUNTER
----- Message from Jonathan Castro MD sent at 5/17/2019  8:02 AM CDT -----  Urine culture is negative

## 2019-07-24 ENCOUNTER — OFFICE VISIT (OUTPATIENT)
Dept: FAMILY MEDICINE CLINIC | Facility: CLINIC | Age: 44
End: 2019-07-24
Payer: COMMERCIAL

## 2019-07-24 ENCOUNTER — APPOINTMENT (OUTPATIENT)
Dept: CT IMAGING | Facility: HOSPITAL | Age: 44
End: 2019-07-24
Attending: EMERGENCY MEDICINE
Payer: COMMERCIAL

## 2019-07-24 ENCOUNTER — HOSPITAL ENCOUNTER (EMERGENCY)
Facility: HOSPITAL | Age: 44
Discharge: HOME | End: 2019-07-24
Attending: EMERGENCY MEDICINE
Payer: COMMERCIAL

## 2019-07-24 VITALS
OXYGEN SATURATION: 100 % | HEART RATE: 68 BPM | HEIGHT: 64 IN | SYSTOLIC BLOOD PRESSURE: 126 MMHG | BODY MASS INDEX: 26.29 KG/M2 | DIASTOLIC BLOOD PRESSURE: 82 MMHG | WEIGHT: 154 LBS

## 2019-07-24 VITALS
OXYGEN SATURATION: 98 % | RESPIRATION RATE: 16 BRPM | TEMPERATURE: 99 F | HEART RATE: 52 BPM | SYSTOLIC BLOOD PRESSURE: 118 MMHG | DIASTOLIC BLOOD PRESSURE: 76 MMHG | BODY MASS INDEX: 26 KG/M2 | WEIGHT: 154 LBS

## 2019-07-24 DIAGNOSIS — M54.50 ACUTE RIGHT-SIDED LOW BACK PAIN WITHOUT SCIATICA: ICD-10-CM

## 2019-07-24 DIAGNOSIS — S16.1XXA STRAIN OF NECK MUSCLE, INITIAL ENCOUNTER: ICD-10-CM

## 2019-07-24 DIAGNOSIS — T07.XXXA MULTIPLE CONTUSIONS: Primary | ICD-10-CM
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
## 2019-07-24 DIAGNOSIS — R07.89 OTHER CHEST PAIN: ICD-10-CM

## 2019-07-24 DIAGNOSIS — S63.642A SPRAIN OF METACARPOPHALANGEAL (MCP) JOINT OF LEFT THUMB, INITIAL ENCOUNTER: ICD-10-CM

## 2019-07-24 DIAGNOSIS — S06.0X0A CONCUSSION WITHOUT LOSS OF CONSCIOUSNESS, INITIAL ENCOUNTER: ICD-10-CM

## 2019-07-24 DIAGNOSIS — S13.4XXA NECK PAIN WITH NECK STIFFNESS AFTER WHIPLASH INJURY TO NECK: ICD-10-CM

## 2019-07-24 DIAGNOSIS — R52 PAIN OF RIGHT SIDE OF BODY: ICD-10-CM

## 2019-07-24 LAB
B-HCG UR QL: NEGATIVE
BILIRUB UR QL: NEGATIVE
CLARITY UR: CLEAR
COLOR UR: YELLOW
GLUCOSE UR-MCNC: NEGATIVE MG/DL
HGB UR QL STRIP.AUTO: NEGATIVE
KETONES UR-MCNC: NEGATIVE MG/DL
LEUKOCYTE ESTERASE UR QL STRIP.AUTO: NEGATIVE
NITRITE UR QL STRIP.AUTO: NEGATIVE
PH UR: 5 [PH] (ref 5–8)
PROT UR-MCNC: NEGATIVE MG/DL
RBC #/AREA URNS AUTO: 5 /HPF
SP GR UR STRIP: 1.03 (ref 1–1.03)
UROBILINOGEN UR STRIP-ACNC: <2
VIT C UR-MCNC: NEGATIVE MG/DL
WBC #/AREA URNS AUTO: 2 /HPF

## 2019-07-24 PROCEDURE — 99284 EMERGENCY DEPT VISIT MOD MDM: CPT

## 2019-07-24 PROCEDURE — 81003 URINALYSIS AUTO W/O SCOPE: CPT | Performed by: EMERGENCY MEDICINE

## 2019-07-24 PROCEDURE — 72125 CT NECK SPINE W/O DYE: CPT | Performed by: EMERGENCY MEDICINE

## 2019-07-24 PROCEDURE — 81025 URINE PREGNANCY TEST: CPT

## 2019-07-24 PROCEDURE — 70450 CT HEAD/BRAIN W/O DYE: CPT | Performed by: EMERGENCY MEDICINE

## 2019-07-24 PROCEDURE — 99214 OFFICE O/P EST MOD 30 MIN: CPT | Performed by: FAMILY MEDICINE

## 2019-07-24 RX ORDER — HYDROCODONE BITARTRATE AND ACETAMINOPHEN 5; 325 MG/1; MG/1
2 TABLET ORAL ONCE
Status: COMPLETED | OUTPATIENT
Start: 2019-07-24 | End: 2019-07-24

## 2019-07-24 RX ORDER — HYDROCODONE BITARTRATE AND ACETAMINOPHEN 5; 325 MG/1; MG/1
1-2 TABLET ORAL EVERY 6 HOURS PRN
Qty: 10 TABLET | Refills: 0 | Status: SHIPPED | OUTPATIENT
Start: 2019-07-24 | End: 2019-07-31

## 2019-07-24 RX ORDER — TRAMADOL HYDROCHLORIDE 50 MG/1
50 TABLET ORAL EVERY 12 HOURS PRN
Qty: 20 TABLET | Refills: 0 | Status: SHIPPED | OUTPATIENT
Start: 2019-07-24 | End: 2019-07-31

## 2019-07-24 RX ORDER — PREDNISONE 20 MG/1
40 TABLET ORAL DAILY
Qty: 10 TABLET | Refills: 0 | Status: SHIPPED | OUTPATIENT
Start: 2019-07-24 | End: 2019-07-29

## 2019-07-24 RX ORDER — CYCLOBENZAPRINE HCL 5 MG
5 TABLET ORAL 3 TIMES DAILY PRN
Qty: 30 TABLET | Refills: 0 | Status: SHIPPED | OUTPATIENT
Start: 2019-07-24 | End: 2019-07-31

## 2019-07-24 RX ORDER — NAPROXEN 500 MG/1
500 TABLET ORAL 2 TIMES DAILY WITH MEALS
Qty: 30 TABLET | Refills: 0 | Status: SHIPPED | OUTPATIENT
Start: 2019-07-24 | End: 2019-07-31
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
## 2019-07-31 ENCOUNTER — OFFICE VISIT (OUTPATIENT)
Dept: FAMILY MEDICINE CLINIC | Facility: CLINIC | Age: 44
End: 2019-07-31
Payer: COMMERCIAL

## 2019-07-31 ENCOUNTER — OFFICE VISIT (OUTPATIENT)
Dept: OBGYN CLINIC | Facility: CLINIC | Age: 44
End: 2019-07-31
Payer: COMMERCIAL

## 2019-07-31 VITALS
DIASTOLIC BLOOD PRESSURE: 81 MMHG | WEIGHT: 153 LBS | HEART RATE: 62 BPM | HEIGHT: 63.4 IN | BODY MASS INDEX: 26.77 KG/M2 | SYSTOLIC BLOOD PRESSURE: 120 MMHG

## 2019-07-31 VITALS
WEIGHT: 155 LBS | DIASTOLIC BLOOD PRESSURE: 64 MMHG | SYSTOLIC BLOOD PRESSURE: 110 MMHG | BODY MASS INDEX: 27.12 KG/M2 | HEIGHT: 63.4 IN | OXYGEN SATURATION: 100 % | HEART RATE: 70 BPM

## 2019-07-31 DIAGNOSIS — S06.0X0A CONCUSSION WITHOUT LOSS OF CONSCIOUSNESS, INITIAL ENCOUNTER: ICD-10-CM

## 2019-07-31 DIAGNOSIS — S13.4XXA NECK PAIN WITH NECK STIFFNESS AFTER WHIPLASH INJURY TO NECK: ICD-10-CM

## 2019-07-31 DIAGNOSIS — F41.1 GAD (GENERALIZED ANXIETY DISORDER): ICD-10-CM
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
## 2019-07-31 DIAGNOSIS — Z01.419 ENCOUNTER FOR GYNECOLOGICAL EXAMINATION WITHOUT ABNORMAL FINDING: Primary | ICD-10-CM

## 2019-07-31 DIAGNOSIS — Z12.31 VISIT FOR SCREENING MAMMOGRAM: ICD-10-CM

## 2019-07-31 DIAGNOSIS — M54.50 ACUTE RIGHT-SIDED LOW BACK PAIN WITHOUT SCIATICA: ICD-10-CM

## 2019-07-31 PROCEDURE — 99214 OFFICE O/P EST MOD 30 MIN: CPT | Performed by: FAMILY MEDICINE

## 2019-07-31 PROCEDURE — 99396 PREV VISIT EST AGE 40-64: CPT | Performed by: OBSTETRICS & GYNECOLOGY

## 2019-07-31 RX ORDER — PREDNISONE 20 MG/1
40 TABLET ORAL DAILY
Qty: 10 TABLET | Refills: 0 | Status: SHIPPED | OUTPATIENT
Start: 2019-07-31 | End: 2019-08-05

## 2019-07-31 RX ORDER — ALPRAZOLAM 0.25 MG/1
0.25 TABLET ORAL DAILY PRN
Qty: 20 TABLET | Refills: 0 | Status: SHIPPED | OUTPATIENT
Start: 2019-07-31 | End: 2021-02-11

## 2019-07-31 RX ORDER — TRAMADOL HYDROCHLORIDE 50 MG/1
50 TABLET ORAL NIGHTLY PRN
Qty: 20 TABLET | Refills: 0 | Status: SHIPPED | OUTPATIENT
Start: 2019-07-31 | End: 2020-01-21

## 2019-07-31 RX ORDER — NAPROXEN 500 MG/1
500 TABLET ORAL 2 TIMES DAILY PRN
Qty: 60 TABLET | Refills: 0 | Status: SHIPPED | OUTPATIENT
Start: 2019-07-31 | End: 2019-09-06

## 2019-07-31 RX ORDER — HYDROCODONE BITARTRATE AND ACETAMINOPHEN 5; 325 MG/1; MG/1
1 TABLET ORAL DAILY PRN
Qty: 20 TABLET | Refills: 0 | Status: SHIPPED | OUTPATIENT
Start: 2019-07-31 | End: 2020-01-21

## 2019-07-31 RX ORDER — CYCLOBENZAPRINE HCL 5 MG
5 TABLET ORAL NIGHTLY PRN
Qty: 30 TABLET | Refills: 0 | Status: SHIPPED | OUTPATIENT
Start: 2019-07-31

## 2019-07-31 RX ORDER — ESCITALOPRAM OXALATE 10 MG/1
10 TABLET ORAL DAILY
Qty: 90 TABLET | Refills: 0 | Status: SHIPPED | OUTPATIENT
Start: 2019-07-31 | End: 2021-02-11
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
## 2019-08-02 PROBLEM — F41.1 GAD (GENERALIZED ANXIETY DISORDER): Status: ACTIVE | Noted: 2019-08-02
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
## 2019-08-27 ENCOUNTER — HOSPITAL ENCOUNTER (OUTPATIENT)
Dept: MAMMOGRAPHY | Facility: HOSPITAL | Age: 44
Discharge: HOME | End: 2019-08-27
Attending: OBSTETRICS & GYNECOLOGY
Payer: COMMERCIAL

## 2019-08-27 DIAGNOSIS — Z12.31 VISIT FOR SCREENING MAMMOGRAM: ICD-10-CM

## 2019-08-27 PROCEDURE — 77063 BREAST TOMOSYNTHESIS BI: CPT | Performed by: OBSTETRICS & GYNECOLOGY

## 2019-08-27 PROCEDURE — 77067 SCR MAMMO BI INCL CAD: CPT | Performed by: OBSTETRICS & GYNECOLOGY

## 2019-09-06 DIAGNOSIS — M54.50 ACUTE RIGHT-SIDED LOW BACK PAIN WITHOUT SCIATICA: ICD-10-CM

## 2019-09-06 RX ORDER — NAPROXEN 500 MG/1
TABLET ORAL
Qty: 60 TABLET | Refills: 0 | Status: SHIPPED | OUTPATIENT
Start: 2019-09-06 | End: 2019-10-08

## 2019-10-08 DIAGNOSIS — M54.50 ACUTE RIGHT-SIDED LOW BACK PAIN WITHOUT SCIATICA: ICD-10-CM

## 2019-10-08 RX ORDER — NAPROXEN 500 MG/1
TABLET ORAL
Qty: 60 TABLET | Refills: 0 | Status: SHIPPED | OUTPATIENT
Start: 2019-10-08 | End: 2021-02-11

## 2019-10-31 DIAGNOSIS — F41.1 GAD (GENERALIZED ANXIETY DISORDER): ICD-10-CM

## 2019-10-31 RX ORDER — ESCITALOPRAM OXALATE 10 MG/1
TABLET ORAL
Qty: 90 TABLET | Refills: 0 | OUTPATIENT
Start: 2019-10-31

## 2020-01-15 ENCOUNTER — TELEPHONE (OUTPATIENT)
Dept: OBGYN CLINIC | Facility: CLINIC | Age: 45
End: 2020-01-15

## 2020-01-15 ENCOUNTER — HOSPITAL ENCOUNTER (EMERGENCY)
Facility: HOSPITAL | Age: 45
Discharge: HOME | End: 2020-01-15
Payer: COMMERCIAL

## 2020-01-15 VITALS
HEIGHT: 63 IN | BODY MASS INDEX: 28.35 KG/M2 | RESPIRATION RATE: 18 BRPM | SYSTOLIC BLOOD PRESSURE: 110 MMHG | DIASTOLIC BLOOD PRESSURE: 69 MMHG | TEMPERATURE: 98 F | HEART RATE: 55 BPM | WEIGHT: 160 LBS | OXYGEN SATURATION: 97 %

## 2020-01-15 DIAGNOSIS — N94.6 DYSMENORRHEA: Primary | ICD-10-CM

## 2020-01-15 LAB
ANION GAP SERPL CALC-SCNC: 3 MMOL/L (ref 0–18)
B-HCG UR QL: NEGATIVE
BASOPHILS # BLD AUTO: 0.03 X10(3) UL (ref 0–0.2)
BASOPHILS NFR BLD AUTO: 0.7 %
BILIRUB UR QL: NEGATIVE
BUN BLD-MCNC: 16 MG/DL (ref 7–18)
BUN/CREAT SERPL: 17 (ref 10–20)
CALCIUM BLD-MCNC: 8.6 MG/DL (ref 8.5–10.1)
CHLORIDE SERPL-SCNC: 108 MMOL/L (ref 98–112)
CLARITY UR: CLEAR
CO2 SERPL-SCNC: 29 MMOL/L (ref 21–32)
COLOR UR: YELLOW
CREAT BLD-MCNC: 0.94 MG/DL (ref 0.55–1.02)
DEPRECATED RDW RBC AUTO: 45.1 FL (ref 35.1–46.3)
EOSINOPHIL # BLD AUTO: 0.09 X10(3) UL (ref 0–0.7)
EOSINOPHIL NFR BLD AUTO: 2.2 %
ERYTHROCYTE [DISTWIDTH] IN BLOOD BY AUTOMATED COUNT: 12.5 % (ref 11–15)
GLUCOSE BLD-MCNC: 77 MG/DL (ref 70–99)
GLUCOSE UR-MCNC: NEGATIVE MG/DL
HCT VFR BLD AUTO: 38.2 % (ref 35–48)
HGB BLD-MCNC: 12.5 G/DL (ref 12–16)
IMM GRANULOCYTES # BLD AUTO: 0.02 X10(3) UL (ref 0–1)
IMM GRANULOCYTES NFR BLD: 0.5 %
KETONES UR-MCNC: NEGATIVE MG/DL
LEUKOCYTE ESTERASE UR QL STRIP.AUTO: NEGATIVE
LYMPHOCYTES # BLD AUTO: 2.13 X10(3) UL (ref 1–4)
LYMPHOCYTES NFR BLD AUTO: 51.8 %
MCH RBC QN AUTO: 32 PG (ref 26–34)
MCHC RBC AUTO-ENTMCNC: 32.7 G/DL (ref 31–37)
MCV RBC AUTO: 97.7 FL (ref 80–100)
MONOCYTES # BLD AUTO: 0.31 X10(3) UL (ref 0.1–1)
MONOCYTES NFR BLD AUTO: 7.5 %
NEUTROPHILS # BLD AUTO: 1.53 X10 (3) UL (ref 1.5–7.7)
NEUTROPHILS # BLD AUTO: 1.53 X10(3) UL (ref 1.5–7.7)
NEUTROPHILS NFR BLD AUTO: 37.3 %
NITRITE UR QL STRIP.AUTO: NEGATIVE
OSMOLALITY SERPL CALC.SUM OF ELEC: 290 MOSM/KG (ref 275–295)
PH UR: 7 [PH] (ref 5–8)
PLATELET # BLD AUTO: 289 10(3)UL (ref 150–450)
POTASSIUM SERPL-SCNC: 4.2 MMOL/L (ref 3.5–5.1)
PROT UR-MCNC: NEGATIVE MG/DL
RBC # BLD AUTO: 3.91 X10(6)UL (ref 3.8–5.3)
RBC #/AREA URNS AUTO: 5 /HPF
SODIUM SERPL-SCNC: 140 MMOL/L (ref 136–145)
SP GR UR STRIP: 1.02 (ref 1–1.03)
TROPONIN I SERPL-MCNC: <0.045 NG/ML (ref ?–0.04)
UROBILINOGEN UR STRIP-ACNC: <2
WBC # BLD AUTO: 4.1 X10(3) UL (ref 4–11)
WBC #/AREA URNS AUTO: <1 /HPF

## 2020-01-15 PROCEDURE — 81025 URINE PREGNANCY TEST: CPT

## 2020-01-15 PROCEDURE — 81001 URINALYSIS AUTO W/SCOPE: CPT

## 2020-01-15 PROCEDURE — 80048 BASIC METABOLIC PNL TOTAL CA: CPT

## 2020-01-15 PROCEDURE — 84484 ASSAY OF TROPONIN QUANT: CPT | Performed by: NURSE PRACTITIONER

## 2020-01-15 PROCEDURE — 85025 COMPLETE CBC W/AUTO DIFF WBC: CPT

## 2020-01-15 PROCEDURE — 93005 ELECTROCARDIOGRAM TRACING: CPT

## 2020-01-15 PROCEDURE — 36415 COLL VENOUS BLD VENIPUNCTURE: CPT

## 2020-01-15 PROCEDURE — 93010 ELECTROCARDIOGRAM REPORT: CPT | Performed by: NURSE PRACTITIONER

## 2020-01-15 PROCEDURE — 99285 EMERGENCY DEPT VISIT HI MDM: CPT

## 2020-01-15 RX ORDER — MEDROXYPROGESTERONE ACETATE 10 MG/1
10 TABLET ORAL DAILY
Qty: 10 TABLET | Refills: 0 | Status: SHIPPED | OUTPATIENT
Start: 2020-01-15 | End: 2020-01-25

## 2020-01-15 NOTE — ED PROVIDER NOTES
Patient Seen in: [facility] AND [facility] Emergency Department    History   CC: vaginal bleeding  HPI: Norma Koehler 39year old female  who presents to the ER c/o menstrual cycle which has present for the last 1 month.   Patient states when it first began she wa vital signs reviewed. All other systems reviewed and negative except as noted above. PSFH elements reviewed from today and agreed except as otherwise stated in HPI.     Medications :   medroxyPROGESTERone Acetate (PROVERA) 10 MG Oral Tab,  Take 1 ta steady gait  MSK - makes purposeful movements of all extremities, radial pulses 2+ bilat.   Psych - Interactive and appropriate      ED Course     Labs Reviewed   URINALYSIS WITH CULTURE REFLEX - Abnormal; Notable for the following components:       Result Nereida Walker MD  [facility] [address]  [place] [address]  [phone]          Lavon Rios MD  [address] [place], [place] [address] [address]  [phone]    Schedule an appointment as soon as possible for a visit in 2 days        [address] [place] [address]

## 2020-01-15 NOTE — TELEPHONE ENCOUNTER
Pt is having bleeding for 7 days with clots .  Pt said every day its getting worse ,  Needs to speak to office ,

## 2020-01-15 NOTE — ED INITIAL ASSESSMENT (HPI)
Patient states she is heavily bleeding x10 days. States she has been bleeding for 4 weeks. Lower right side abd pain. Hx of cyst.    Patient states she feels weak.

## 2020-01-15 NOTE — TELEPHONE ENCOUNTER
C/O BLEEDING FOR 4 WEEKS. H/O IRREGULAR CYCLES BUT NON HAVE LASTED THIS LONG.  BLEEDING HAS GOTTEN REALLY HEAVY IN THE PAST COUPLE DAYS AND IS SATURATING A PAD/HOUR OR LESS IN THE PAST 24 HOURS AND PROBABLY Q 2 HOURS FOR AT LEAST THE WEEK BEFORE THIS.   IS

## 2020-01-20 ENCOUNTER — TELEPHONE (OUTPATIENT)
Dept: FAMILY MEDICINE CLINIC | Facility: CLINIC | Age: 45
End: 2020-01-20

## 2020-01-20 NOTE — TELEPHONE ENCOUNTER
Per Dr Brooklynn Jennings no need to repeat EKG as of now, if she develops any kind of symptoms and would like to have a repeat EKG to have her schedule an appointment to discuss further treatment options, patient was advised on what to do and is agreeable to the pl

## 2020-01-20 NOTE — TELEPHONE ENCOUNTER
Patient is calling requesting an appointment for f/u from e.r due to abnormal ekg. Please advice. There is nothing available until 02/20 patient does not want to wait till then.

## 2020-01-21 ENCOUNTER — OFFICE VISIT (OUTPATIENT)
Dept: OBGYN CLINIC | Facility: CLINIC | Age: 45
End: 2020-01-21
Payer: COMMERCIAL

## 2020-01-21 VITALS
DIASTOLIC BLOOD PRESSURE: 80 MMHG | HEART RATE: 66 BPM | SYSTOLIC BLOOD PRESSURE: 119 MMHG | WEIGHT: 162 LBS | BODY MASS INDEX: 29 KG/M2

## 2020-01-21 DIAGNOSIS — N92.1 MENORRHAGIA WITH IRREGULAR CYCLE: Primary | ICD-10-CM

## 2020-01-21 DIAGNOSIS — N92.0 EXCESSIVE OR FREQUENT MENSTRUATION: ICD-10-CM

## 2020-01-21 PROCEDURE — 58100 BIOPSY OF UTERUS LINING: CPT | Performed by: OBSTETRICS & GYNECOLOGY

## 2020-01-21 NOTE — PROGRESS NOTES
Jose Celis    1975       Patient presents with:   Follow - Up: er follow up for vaginal bleeding also on going pain also need refill on ALPRAZOLAM  pt states that she did not have menses in November and a normal menses in December but it did not • Exposure to genital herpes    • Migraine    • Pelvic pain in female     SINCE FEB/ 2018       Past Surgical History:   Procedure Laterality Date   • LAPAROSCOPY,DIAGNOSTIC          Menarche: 8years old  Use of Birth Control (if yes, specify type):  Solomon Gramajo for follow - up. Diagnoses and all orders for this visit:    Menorrhagia with irregular cycle  -     US PELVIS W EV (CPT=76856/52624);  Future      15 minutes spent in this discussion, see separate note for EMB

## 2020-02-03 ENCOUNTER — HOSPITAL ENCOUNTER (OUTPATIENT)
Dept: ULTRASOUND IMAGING | Facility: HOSPITAL | Age: 45
Discharge: HOME | End: 2020-02-03
Attending: OBSTETRICS & GYNECOLOGY
Payer: COMMERCIAL

## 2020-02-03 DIAGNOSIS — N92.1 MENORRHAGIA WITH IRREGULAR CYCLE: ICD-10-CM

## 2020-02-03 PROCEDURE — 76856 US EXAM PELVIC COMPLETE: CPT | Performed by: OBSTETRICS & GYNECOLOGY

## 2020-02-03 PROCEDURE — 76830 TRANSVAGINAL US NON-OB: CPT | Performed by: OBSTETRICS & GYNECOLOGY

## 2020-02-04 DIAGNOSIS — M54.50 ACUTE RIGHT-SIDED LOW BACK PAIN WITHOUT SCIATICA: ICD-10-CM

## 2020-02-04 DIAGNOSIS — F41.1 GAD (GENERALIZED ANXIETY DISORDER): ICD-10-CM

## 2020-02-05 RX ORDER — NAPROXEN 500 MG/1
TABLET ORAL
Qty: 60 TABLET | Refills: 0 | OUTPATIENT
Start: 2020-02-05

## 2020-02-05 RX ORDER — ALPRAZOLAM 0.25 MG/1
TABLET ORAL
Qty: 20 TABLET | Refills: 0 | OUTPATIENT
Start: 2020-02-05

## 2020-02-06 ENCOUNTER — TELEPHONE (OUTPATIENT)
Dept: OBGYN CLINIC | Facility: CLINIC | Age: 45
End: 2020-02-06

## 2020-02-06 NOTE — TELEPHONE ENCOUNTER
----- Message from Meagan Cheema MD sent at 1/24/2020  1:51 PM CST -----  Endometrial biopsy negative, call if problem persists or recurs, call pt

## 2020-02-06 NOTE — TELEPHONE ENCOUNTER
Pt informed of CAPs recs and verbalized understanding. Pt stated she took 10 days of the provera that was prescribed by CAP and got a period a week later. Pt stated that her periods are still very heavy even after taking provera.  Pt stated her LMP was 2/2

## 2020-02-13 NOTE — TELEPHONE ENCOUNTER
I do advise the mirena IUD, please precert if necessary and if pt is ready to proceed then please instruct her to call on the first day of her next menses to schedule insertion

## 2020-02-13 NOTE — TELEPHONE ENCOUNTER
Pt notified of CAP recs and agrees to call back on day 1 of her next cycle. (Pt has PPO insurance and PA not required).

## 2020-06-04 ENCOUNTER — TELEPHONE (OUTPATIENT)
Dept: FAMILY MEDICINE CLINIC | Facility: CLINIC | Age: 45
End: 2020-06-04

## 2020-06-04 NOTE — TELEPHONE ENCOUNTER
Patient calling to get an appt with Dr. Manuela Herman for lower abdominal pain. Started Monday. She believes it might be bladder infection. She reports urinating more frequently with pain upon urination.   She has taken Aleve the day before, and yesterday she \

## 2020-10-28 ENCOUNTER — TELEMEDICINE (OUTPATIENT)
Dept: FAMILY MEDICINE CLINIC | Facility: CLINIC | Age: 45
End: 2020-10-28
Payer: COMMERCIAL

## 2020-10-28 DIAGNOSIS — R51.9 NEW ONSET OF HEADACHES: Primary | ICD-10-CM

## 2020-10-28 DIAGNOSIS — R53.83 OTHER FATIGUE: ICD-10-CM

## 2020-10-28 PROCEDURE — 99213 OFFICE O/P EST LOW 20 MIN: CPT | Performed by: INTERNAL MEDICINE

## 2020-10-29 ENCOUNTER — APPOINTMENT (OUTPATIENT)
Dept: LAB | Facility: HOSPITAL | Age: 45
End: 2020-10-29
Attending: INTERNAL MEDICINE
Payer: COMMERCIAL

## 2020-10-29 DIAGNOSIS — R53.83 OTHER FATIGUE: ICD-10-CM

## 2020-10-29 DIAGNOSIS — R51.9 NEW ONSET OF HEADACHES: ICD-10-CM

## 2020-11-05 ENCOUNTER — TELEPHONE (OUTPATIENT)
Dept: FAMILY MEDICINE CLINIC | Facility: CLINIC | Age: 45
End: 2020-11-05

## 2020-11-05 NOTE — TELEPHONE ENCOUNTER
Patient was already aware of the results and reports to be doing well.    ----- Message from Tatianna Culver MD sent at 11/2/2020  2:40 PM CST -----  Please inform that Covid 19 PCR was negative.   Thank you

## 2020-12-08 NOTE — PROGRESS NOTES
[facility]  Virtual Telephone Note      HPI:     Lisa Heath verbally consents to a Virtual/Telephone Check-In visit on 10/27/2020        Patient understands and accepts financial responsibility for any deductible, co-insurance and/ soft tissue measuring in aggregate 2.9 x 2.5 x 0.4 cm. The specimen is submitted entirely in cassette A1.  (jq)     Josette Bill M.D./nigel      Interpretation Benign         Labs:   CMP:  Lab Results   Component Value Date/Time     01/15/2020 12:13 P Relation Age of Onset   • Hypertension Mother    • Lipids Mother         hyperlipidemia   • Other (Other) Mother    • Cancer Cousin         leukemia   • Cancer Paternal Uncle         unsure   • Stroke Maternal Grandmother    • Stroke Maternal Grandfather Encounters:  01/21/20 : 162 lb (73.5 kg)  01/15/20 : 160 lb (72.6 kg)  07/31/19 : 155 lb (70.3 kg)      Physical Exam    PE: Appears AxOx3, no increased work of breathing, speaking in full sentences        ASSESSMENT AND PLAN:   Patient is a 39year old fe

## 2021-01-06 ENCOUNTER — TELEPHONE (OUTPATIENT)
Dept: OBGYN CLINIC | Facility: CLINIC | Age: 46
End: 2021-01-06

## 2021-01-06 NOTE — TELEPHONE ENCOUNTER
Pt reports rregular bleeding since last week of October 2020. Pt states she had heavy bleeding for about 2.5 wks and then bleeding turned into spotting and is currently spotting. Pt reports RLQ pain \"for years. \" Pt states the pain has increased since Oct

## 2021-01-06 NOTE — TELEPHONE ENCOUNTER
Annual scheduled for 3/4/21. Patient would like to be seen sooner because she has had her menstrual cycle since October. She is also experiencing increased pelvic pain. Would like to speak to a nurse to see if Dr. Hortencia Gross can see her sooner.   This has been

## 2021-01-11 NOTE — TELEPHONE ENCOUNTER
Latesha Chu for 1/27 appt but please advise pt that we may need to do an EMB at that visit so take motrin or ibuprofen 600mg po with food and water 30 min before appt.

## 2021-01-15 ENCOUNTER — TELEPHONE (OUTPATIENT)
Dept: OBGYN CLINIC | Facility: CLINIC | Age: 46
End: 2021-01-15

## 2021-01-15 NOTE — TELEPHONE ENCOUNTER
See 1/6/21. Pt reports bleeding started heavy again this afternoon. States she had to change a pad after 10 minutes. States when she went to the bathroom the blood was running in the toilet. Denies pain. States she feels light headed and lips feels tingly.

## 2021-01-16 NOTE — TELEPHONE ENCOUNTER
Pt states she did not go to the ER because after speaking to nurse the bleeding slowed down. Reports bleeding is a regular period flow. Was able to have a good night sleep states she is not sure if it is because she was exhausted.  Informed pt to call us ba

## 2021-01-27 ENCOUNTER — OFFICE VISIT (OUTPATIENT)
Dept: OBGYN CLINIC | Facility: CLINIC | Age: 46
End: 2021-01-27
Payer: COMMERCIAL

## 2021-01-27 ENCOUNTER — LAB ENCOUNTER (OUTPATIENT)
Dept: LAB | Facility: HOSPITAL | Age: 46
End: 2021-01-27
Attending: OBSTETRICS & GYNECOLOGY
Payer: COMMERCIAL

## 2021-01-27 VITALS
WEIGHT: 162 LBS | DIASTOLIC BLOOD PRESSURE: 81 MMHG | HEART RATE: 64 BPM | SYSTOLIC BLOOD PRESSURE: 125 MMHG | BODY MASS INDEX: 29 KG/M2

## 2021-01-27 DIAGNOSIS — N92.1 MENORRHAGIA WITH IRREGULAR CYCLE: ICD-10-CM

## 2021-01-27 DIAGNOSIS — N92.1 MENORRHAGIA WITH IRREGULAR CYCLE: Primary | ICD-10-CM

## 2021-01-27 DIAGNOSIS — N92.0 EXCESSIVE OR FREQUENT MENSTRUATION: ICD-10-CM

## 2021-01-27 LAB
DEPRECATED RDW RBC AUTO: 44.3 FL (ref 35.1–46.3)
ERYTHROCYTE [DISTWIDTH] IN BLOOD BY AUTOMATED COUNT: 12.4 % (ref 11–15)
HCT VFR BLD AUTO: 38.7 %
HGB BLD-MCNC: 12.3 G/DL
MCH RBC QN AUTO: 30.6 PG (ref 26–34)
MCHC RBC AUTO-ENTMCNC: 31.8 G/DL (ref 31–37)
MCV RBC AUTO: 96.3 FL
PLATELET # BLD AUTO: 293 10(3)UL (ref 150–450)
RBC # BLD AUTO: 4.02 X10(6)UL
TSI SER-ACNC: 1.14 MIU/ML (ref 0.36–3.74)
WBC # BLD AUTO: 4 X10(3) UL (ref 4–11)

## 2021-01-27 PROCEDURE — 36415 COLL VENOUS BLD VENIPUNCTURE: CPT

## 2021-01-27 PROCEDURE — 84443 ASSAY THYROID STIM HORMONE: CPT

## 2021-01-27 PROCEDURE — 58100 BIOPSY OF UTERUS LINING: CPT | Performed by: OBSTETRICS & GYNECOLOGY

## 2021-01-27 PROCEDURE — 3079F DIAST BP 80-89 MM HG: CPT | Performed by: OBSTETRICS & GYNECOLOGY

## 2021-01-27 PROCEDURE — 85027 COMPLETE CBC AUTOMATED: CPT

## 2021-01-27 PROCEDURE — 99212 OFFICE O/P EST SF 10 MIN: CPT | Performed by: OBSTETRICS & GYNECOLOGY

## 2021-01-27 PROCEDURE — 3074F SYST BP LT 130 MM HG: CPT | Performed by: OBSTETRICS & GYNECOLOGY

## 2021-01-27 NOTE — PROGRESS NOTES
Pina Ness    1975       Patient presents with:  Gyn Problem: cycle has been going on since october non stop. ...with blood clots  pt has been bleeding daily since October. Pt reports rregular bleeding since last week of 2020.  Pt state 2020-02-02       Pelvis and Uterus:                Uterus Length:       10.02 cm           Uterus Height:       4.57 cm           Uterus Width:         4.94 cm           Endometrium Thickness:    0.59 cm       Findings:                  Ovary: situation.  Appropriate mood and affect    Pelvic Exam:  External Genitalia: normal appearance, hair distribution, and no lesions  Urethral Meatus:  normal in size, location, without lesions and prolapse  Vagina:  Normal appearance without lesions, no abnor

## 2021-01-29 ENCOUNTER — HOSPITAL ENCOUNTER (OUTPATIENT)
Dept: ULTRASOUND IMAGING | Age: 46
Discharge: HOME | End: 2021-01-29
Attending: OBSTETRICS & GYNECOLOGY
Payer: COMMERCIAL

## 2021-01-29 DIAGNOSIS — N92.1 MENORRHAGIA WITH IRREGULAR CYCLE: ICD-10-CM

## 2021-01-29 PROCEDURE — 76830 TRANSVAGINAL US NON-OB: CPT | Performed by: OBSTETRICS & GYNECOLOGY

## 2021-01-29 PROCEDURE — 76856 US EXAM PELVIC COMPLETE: CPT | Performed by: OBSTETRICS & GYNECOLOGY

## 2021-02-11 ENCOUNTER — OFFICE VISIT (OUTPATIENT)
Dept: FAMILY MEDICINE CLINIC | Facility: CLINIC | Age: 46
End: 2021-02-11
Payer: COMMERCIAL

## 2021-02-11 VITALS
HEART RATE: 69 BPM | WEIGHT: 166 LBS | SYSTOLIC BLOOD PRESSURE: 112 MMHG | BODY MASS INDEX: 29.41 KG/M2 | OXYGEN SATURATION: 98 % | DIASTOLIC BLOOD PRESSURE: 64 MMHG | HEIGHT: 63 IN

## 2021-02-11 DIAGNOSIS — G89.29 CHRONIC RLQ PAIN: ICD-10-CM

## 2021-02-11 DIAGNOSIS — R10.31 CHRONIC RLQ PAIN: ICD-10-CM

## 2021-02-11 DIAGNOSIS — N93.9 ABNORMAL UTERINE BLEEDING (AUB): ICD-10-CM

## 2021-02-11 DIAGNOSIS — Z00.00 HEALTHCARE MAINTENANCE: ICD-10-CM

## 2021-02-11 DIAGNOSIS — Z12.31 ENCOUNTER FOR SCREENING MAMMOGRAM FOR MALIGNANT NEOPLASM OF BREAST: ICD-10-CM

## 2021-02-11 DIAGNOSIS — M54.50 ACUTE RIGHT-SIDED LOW BACK PAIN WITHOUT SCIATICA: ICD-10-CM

## 2021-02-11 DIAGNOSIS — R10.2 PELVIC PAIN IN FEMALE: ICD-10-CM

## 2021-02-11 DIAGNOSIS — Z00.00 WELLNESS EXAMINATION: Primary | ICD-10-CM

## 2021-02-11 LAB
ALBUMIN SERPL-MCNC: 3.7 G/DL (ref 3.4–5)
ALBUMIN/GLOB SERPL: 0.9 {RATIO} (ref 1–2)
ALP LIVER SERPL-CCNC: 69 U/L
ALT SERPL-CCNC: 24 U/L
ANION GAP SERPL CALC-SCNC: 3 MMOL/L (ref 0–18)
AST SERPL-CCNC: 14 U/L (ref 15–37)
BILIRUB SERPL-MCNC: 0.3 MG/DL (ref 0.1–2)
BUN BLD-MCNC: 16 MG/DL (ref 7–18)
BUN/CREAT SERPL: 15.5 (ref 10–20)
CALCIUM BLD-MCNC: 8.9 MG/DL (ref 8.5–10.1)
CHLORIDE SERPL-SCNC: 111 MMOL/L (ref 98–112)
CHOLEST SMN-MCNC: 223 MG/DL (ref ?–200)
CO2 SERPL-SCNC: 26 MMOL/L (ref 21–32)
CREAT BLD-MCNC: 1.03 MG/DL
GLOBULIN PLAS-MCNC: 4 G/DL (ref 2.8–4.4)
GLUCOSE BLD-MCNC: 103 MG/DL (ref 70–99)
HDLC SERPL-MCNC: 63 MG/DL (ref 40–59)
LDLC SERPL CALC-MCNC: 141 MG/DL (ref ?–100)
M PROTEIN MFR SERPL ELPH: 7.7 G/DL (ref 6.4–8.2)
NONHDLC SERPL-MCNC: 160 MG/DL (ref ?–130)
OSMOLALITY SERPL CALC.SUM OF ELEC: 291 MOSM/KG (ref 275–295)
PATIENT FASTING Y/N/NP: YES
PATIENT FASTING Y/N/NP: YES
POTASSIUM SERPL-SCNC: 4.7 MMOL/L (ref 3.5–5.1)
SODIUM SERPL-SCNC: 140 MMOL/L (ref 136–145)
TRIGL SERPL-MCNC: 97 MG/DL (ref 30–149)
VLDLC SERPL CALC-MCNC: 19 MG/DL (ref 0–30)

## 2021-02-11 PROCEDURE — 99396 PREV VISIT EST AGE 40-64: CPT | Performed by: FAMILY MEDICINE

## 2021-02-11 PROCEDURE — 80053 COMPREHEN METABOLIC PANEL: CPT | Performed by: FAMILY MEDICINE

## 2021-02-11 PROCEDURE — 3074F SYST BP LT 130 MM HG: CPT | Performed by: FAMILY MEDICINE

## 2021-02-11 PROCEDURE — 3078F DIAST BP <80 MM HG: CPT | Performed by: FAMILY MEDICINE

## 2021-02-11 PROCEDURE — 80061 LIPID PANEL: CPT | Performed by: FAMILY MEDICINE

## 2021-02-11 PROCEDURE — 36415 COLL VENOUS BLD VENIPUNCTURE: CPT | Performed by: FAMILY MEDICINE

## 2021-02-11 PROCEDURE — 3008F BODY MASS INDEX DOCD: CPT | Performed by: FAMILY MEDICINE

## 2021-02-11 RX ORDER — NAPROXEN 500 MG/1
TABLET ORAL
Qty: 60 TABLET | Refills: 2 | Status: SHIPPED | OUTPATIENT
Start: 2021-02-11

## 2021-02-11 NOTE — PROGRESS NOTES
CC: Annual Physical Exam    HPI:   Maryam Hussein is a 42year old female who presents for a complete physical exam.    HCM  -Diet:  Well-balanced.   -Exercise active  -Mental Health: mild depression/ anxiety sx secondary to pain.  Self-discontinued lexapro a hyperlipidemia   • Other (Other) Mother    • Cancer Cousin         leukemia   • Cancer Paternal Uncle         unsure   • Stroke Maternal Grandmother    • Stroke Maternal Grandfather    • No Known Problems Father    • No Known Problems Daughter    • No Know thyromegaly present. Cardiovascular: Normal rate, regular rhythm and normal heart sounds. No murmur heard. Edema not present. Pulmonary/Chest: Effort normal and breath sounds normal. No respiratory distress. She has no wheezes. She has no rales. plan.  Return if symptoms worsen or fail to improve. Reasurrance and education provided. All questions answered. Red flags/ ER precautions discussed.

## 2021-02-12 ENCOUNTER — TELEPHONE (OUTPATIENT)
Dept: FAMILY MEDICINE CLINIC | Facility: CLINIC | Age: 46
End: 2021-02-12

## 2021-02-12 NOTE — TELEPHONE ENCOUNTER
----- Message from Lauri Woodruff MD sent at 2/12/2021  1:06 PM CST -----  Please let her know   1) Your cholesterol panel is a bit elevated. This is reversible with a low fat diet and exercise.   2) Kidneys need more hydration  3) rest of labs ar

## 2021-02-19 ENCOUNTER — OFFICE VISIT (OUTPATIENT)
Dept: FAMILY MEDICINE CLINIC | Facility: CLINIC | Age: 46
End: 2021-02-19
Payer: COMMERCIAL

## 2021-02-19 VITALS
OXYGEN SATURATION: 97 % | HEART RATE: 94 BPM | RESPIRATION RATE: 16 BRPM | HEIGHT: 63 IN | WEIGHT: 166 LBS | BODY MASS INDEX: 29.41 KG/M2 | DIASTOLIC BLOOD PRESSURE: 77 MMHG | SYSTOLIC BLOOD PRESSURE: 133 MMHG | TEMPERATURE: 98 F

## 2021-02-19 DIAGNOSIS — Z20.822 EXPOSURE TO COVID-19 VIRUS: Primary | ICD-10-CM

## 2021-02-19 PROCEDURE — 3008F BODY MASS INDEX DOCD: CPT | Performed by: NURSE PRACTITIONER

## 2021-02-19 PROCEDURE — 3075F SYST BP GE 130 - 139MM HG: CPT | Performed by: NURSE PRACTITIONER

## 2021-02-19 PROCEDURE — 99213 OFFICE O/P EST LOW 20 MIN: CPT | Performed by: NURSE PRACTITIONER

## 2021-02-19 PROCEDURE — 3078F DIAST BP <80 MM HG: CPT | Performed by: NURSE PRACTITIONER

## 2021-02-19 NOTE — PATIENT INSTRUCTIONS
Coronavirus Disease 2019 (COVID-19)     [place] [address] is committed to the safety and well-being of our patients, members, employees, and communities.  As concerns arise about the new strain of coronavirus that causes COVID-19, [place] [address] ? After 10 days without testing from date of last exposure  ? After day 7 from date of last exposure with a negative test result (test must occur on day 5 or later)  After stopping quarantine, you should  ? Watch for symptoms until 14 days after exposure. 10. Clean all surfaces that are touched often, like counters, tabletops, and doorknobs. Use household cleaning sprays or wipes according to the label instructions.          Seek Further Care     If you are awaiting test results or are confirmed positive for Patients with pending COVID-19 test results should follow all care and home isolation instructions. Your test results will be called to you from an [facility] representative.  If you have not received a call within 2 business days, please call your pr *Some people will be required to have a repeat COVID-19 test in order to be eligible to donate. If you’re instructed by Noah Chung that a repeat test is required, please contact the [address] COVID-19 Nurse Triage Line at [phone].     Additional Inf

## 2021-02-19 NOTE — PROGRESS NOTES
CHIEF COMPLAINT:   Patient presents with:  Covid      HPI:   Megan Tena is a 42year old female who presents with request for COVID testing. Reports exposure to daughter on 2/10. Daughter has been isolating away from parents.    Denies fever, chills, bod ASSESSMENT:   Exposure to covid-19 virus  (primary encounter diagnosis)      Pt is asymptomatic. PLAN:    COVID-19 testing ordered. Advised to self quarantine at home for 14 days from last known COVID exposure.    Follow up with PCP if symptoms deve Reducing the length of quarantine may make it easier for people to quarantine by reducing the time they cannot work. A shorter quarantine period also can lessen stress on the public health system, especially when new infections are rapidly rising.   Your lo 7. Wash your hands often with soap and water for at least 20 seconds or clean your hands with an alcohol-based hand  that contains at least 60% alcohol. 8. As much as possible, stay in a specific room and away from other people in your home.  Leonides Villagomez If you have a fever with cough or shortness of breath but have not been exposed to someone with COVID-19 and have not tested positive for COVID-19, you should also stay home and away from others for a total of 10 days after your symptoms started, and at [employer] Potential convalescent plasma donors must:    · Have had a confirmed diagnosis of COVID-19  · Be symptom-free for at least 14 days*    *Some people will be required to have a repeat COVID-19 test in order to be eligible to donate.  If you’re instructed by V

## 2021-02-20 LAB — SARS-COV-2 RNA RESP QL NAA+PROBE: NOT DETECTED

## 2021-03-03 ENCOUNTER — OFFICE VISIT (OUTPATIENT)
Dept: OBGYN CLINIC | Facility: CLINIC | Age: 46
End: 2021-03-03
Payer: COMMERCIAL

## 2021-03-03 VITALS
WEIGHT: 166.38 LBS | HEART RATE: 63 BPM | HEIGHT: 63 IN | DIASTOLIC BLOOD PRESSURE: 77 MMHG | SYSTOLIC BLOOD PRESSURE: 117 MMHG | BODY MASS INDEX: 29.48 KG/M2

## 2021-03-03 DIAGNOSIS — Z01.419 ENCOUNTER FOR GYNECOLOGICAL EXAMINATION WITHOUT ABNORMAL FINDING: Primary | ICD-10-CM

## 2021-03-03 DIAGNOSIS — Z12.31 VISIT FOR SCREENING MAMMOGRAM: ICD-10-CM

## 2021-03-03 PROCEDURE — 3074F SYST BP LT 130 MM HG: CPT | Performed by: OBSTETRICS & GYNECOLOGY

## 2021-03-03 PROCEDURE — 3008F BODY MASS INDEX DOCD: CPT | Performed by: OBSTETRICS & GYNECOLOGY

## 2021-03-03 PROCEDURE — 99396 PREV VISIT EST AGE 40-64: CPT | Performed by: OBSTETRICS & GYNECOLOGY

## 2021-03-03 PROCEDURE — 3078F DIAST BP <80 MM HG: CPT | Performed by: OBSTETRICS & GYNECOLOGY

## 2021-03-03 NOTE — PROGRESS NOTES
Torie Salvador is a 42year old female [id number] Patient's last menstrual period was 02/27/2021. Patient presents with:  Gyn Exam: ANNUAL EXAM, MAMMO ORDER  . Her cycles are regular. She has no complaints. Menses started on Saturday.   stil with pelvic pa file        Minutes per session: Not on file      Stress: Not on file    Relationships      Social connections        Talks on phone: Not on file        Gets together: Not on file        Attends [employer] service: Not on file        Active member of club o MOUTH TWICE DAILY AS NEEDED FOR PAIN/ INFLAMMATION, Disp: 60 tablet, Rfl: 2  •  Cyclobenzaprine HCl 5 MG Oral Tab, Take 1 tablet (5 mg total) by mouth nightly as needed for Muscle spasms. Muscle spasm.  May cause drowsiness, Disp: 30 tablet, Rfl: 0    ALLER lesions  Urethral Meatus:  normal in size, location, without lesions and prolapse  Bladder:  No fullness, masses or tenderness  Vagina:  Normal appearance without lesions, no abnormal discharge  Cervix:  Normal without tenderness on motion  Uterus: normal

## 2021-03-21 ENCOUNTER — IMMUNIZATION (OUTPATIENT)
Dept: LAB | Facility: HOSPITAL | Age: 46
End: 2021-03-21
Attending: HOSPITALIST
Payer: COMMERCIAL

## 2021-03-21 DIAGNOSIS — Z23 NEED FOR VACCINATION: Primary | ICD-10-CM

## 2021-03-21 PROCEDURE — 0011A SARSCOV2 VAC 100MCG/0.5ML IM: CPT

## 2021-04-18 ENCOUNTER — IMMUNIZATION (OUTPATIENT)
Dept: LAB | Facility: HOSPITAL | Age: 46
End: 2021-04-18
Attending: EMERGENCY MEDICINE
Payer: COMMERCIAL

## 2021-04-18 DIAGNOSIS — Z23 NEED FOR VACCINATION: Primary | ICD-10-CM

## 2021-04-18 PROCEDURE — 0012A SARSCOV2 VAC 100MCG/0.5ML IM: CPT

## 2021-09-23 ENCOUNTER — HOSPITAL ENCOUNTER (EMERGENCY)
Facility: HOSPITAL | Age: 46
Discharge: HOME | End: 2021-09-23
Payer: COMMERCIAL

## 2021-09-23 ENCOUNTER — APPOINTMENT (OUTPATIENT)
Dept: GENERAL RADIOLOGY | Facility: HOSPITAL | Age: 46
End: 2021-09-23
Attending: NURSE PRACTITIONER
Payer: COMMERCIAL

## 2021-09-23 VITALS
WEIGHT: 155 LBS | RESPIRATION RATE: 18 BRPM | TEMPERATURE: 97 F | DIASTOLIC BLOOD PRESSURE: 90 MMHG | HEART RATE: 51 BPM | OXYGEN SATURATION: 98 % | BODY MASS INDEX: 27.46 KG/M2 | HEIGHT: 63 IN | SYSTOLIC BLOOD PRESSURE: 109 MMHG

## 2021-09-23 DIAGNOSIS — R06.02 SOB (SHORTNESS OF BREATH): ICD-10-CM

## 2021-09-23 DIAGNOSIS — R07.9 CHEST PAIN OF UNCERTAIN ETIOLOGY: Primary | ICD-10-CM

## 2021-09-23 LAB
ANION GAP SERPL CALC-SCNC: 8 MMOL/L (ref 0–18)
B-HCG UR QL: NEGATIVE
B-HCG UR QL: NEGATIVE
BASOPHILS # BLD AUTO: 0.03 X10(3) UL (ref 0–0.2)
BASOPHILS NFR BLD AUTO: 0.7 %
BILIRUB UR QL: NEGATIVE
BUN BLD-MCNC: 13 MG/DL (ref 7–18)
BUN/CREAT SERPL: 15.5 (ref 10–20)
CALCIUM BLD-MCNC: 9.2 MG/DL (ref 8.5–10.1)
CHLORIDE SERPL-SCNC: 109 MMOL/L (ref 98–112)
CO2 SERPL-SCNC: 22 MMOL/L (ref 21–32)
COLOR UR: YELLOW
CREAT BLD-MCNC: 0.84 MG/DL
D DIMER PPP FEU-MCNC: 0.38 UG/ML FEU (ref ?–0.5)
DEPRECATED RDW RBC AUTO: 47.9 FL (ref 35.1–46.3)
EOSINOPHIL # BLD AUTO: 0.08 X10(3) UL (ref 0–0.7)
EOSINOPHIL NFR BLD AUTO: 1.8 %
ERYTHROCYTE [DISTWIDTH] IN BLOOD BY AUTOMATED COUNT: 14.1 % (ref 11–15)
GLUCOSE BLD-MCNC: 91 MG/DL (ref 70–99)
GLUCOSE UR-MCNC: NEGATIVE MG/DL
HCT VFR BLD AUTO: 41.9 %
HGB BLD-MCNC: 13.5 G/DL
IMM GRANULOCYTES # BLD AUTO: 0.01 X10(3) UL (ref 0–1)
IMM GRANULOCYTES NFR BLD: 0.2 %
KETONES UR-MCNC: 20 MG/DL
LEUKOCYTE ESTERASE UR QL STRIP.AUTO: NEGATIVE
LYMPHOCYTES # BLD AUTO: 1.73 X10(3) UL (ref 1–4)
LYMPHOCYTES NFR BLD AUTO: 38.5 %
MCH RBC QN AUTO: 29.8 PG (ref 26–34)
MCHC RBC AUTO-ENTMCNC: 32.2 G/DL (ref 31–37)
MCV RBC AUTO: 92.5 FL
MONOCYTES # BLD AUTO: 0.37 X10(3) UL (ref 0.1–1)
MONOCYTES NFR BLD AUTO: 8.2 %
NEUTROPHILS # BLD AUTO: 2.27 X10 (3) UL (ref 1.5–7.7)
NEUTROPHILS # BLD AUTO: 2.27 X10(3) UL (ref 1.5–7.7)
NEUTROPHILS NFR BLD AUTO: 50.6 %
NITRITE UR QL STRIP.AUTO: NEGATIVE
OSMOLALITY SERPL CALC.SUM OF ELEC: 288 MOSM/KG (ref 275–295)
PH UR: 5 [PH] (ref 5–8)
PLATELET # BLD AUTO: 262 10(3)UL (ref 150–450)
POTASSIUM SERPL-SCNC: 3.7 MMOL/L (ref 3.5–5.1)
PROT UR-MCNC: NEGATIVE MG/DL
RBC # BLD AUTO: 4.53 X10(6)UL
SARS-COV-2 RNA RESP QL NAA+PROBE: NOT DETECTED
SODIUM SERPL-SCNC: 139 MMOL/L (ref 136–145)
SP GR UR STRIP: 1.03 (ref 1–1.03)
TROPONIN I SERPL-MCNC: <0.045 NG/ML (ref ?–0.04)
UROBILINOGEN UR STRIP-ACNC: <2
WBC # BLD AUTO: 4.5 X10(3) UL (ref 4–11)

## 2021-09-23 PROCEDURE — 85379 FIBRIN DEGRADATION QUANT: CPT | Performed by: NURSE PRACTITIONER

## 2021-09-23 PROCEDURE — 99284 EMERGENCY DEPT VISIT MOD MDM: CPT

## 2021-09-23 PROCEDURE — 80048 BASIC METABOLIC PNL TOTAL CA: CPT | Performed by: NURSE PRACTITIONER

## 2021-09-23 PROCEDURE — 96361 HYDRATE IV INFUSION ADD-ON: CPT

## 2021-09-23 PROCEDURE — 71045 X-RAY EXAM CHEST 1 VIEW: CPT | Performed by: NURSE PRACTITIONER

## 2021-09-23 PROCEDURE — 96375 TX/PRO/DX INJ NEW DRUG ADDON: CPT

## 2021-09-23 PROCEDURE — 81001 URINALYSIS AUTO W/SCOPE: CPT | Performed by: NURSE PRACTITIONER

## 2021-09-23 PROCEDURE — 81025 URINE PREGNANCY TEST: CPT

## 2021-09-23 PROCEDURE — 96374 THER/PROPH/DIAG INJ IV PUSH: CPT

## 2021-09-23 PROCEDURE — 85025 COMPLETE CBC W/AUTO DIFF WBC: CPT | Performed by: NURSE PRACTITIONER

## 2021-09-23 PROCEDURE — 84484 ASSAY OF TROPONIN QUANT: CPT | Performed by: NURSE PRACTITIONER

## 2021-09-23 PROCEDURE — 93005 ELECTROCARDIOGRAM TRACING: CPT

## 2021-09-23 PROCEDURE — 93010 ELECTROCARDIOGRAM REPORT: CPT | Performed by: INTERNAL MEDICINE

## 2021-09-23 RX ORDER — KETOROLAC TROMETHAMINE 15 MG/ML
15 INJECTION, SOLUTION INTRAMUSCULAR; INTRAVENOUS ONCE
Status: COMPLETED | OUTPATIENT
Start: 2021-09-23 | End: 2021-09-23

## 2021-09-23 RX ORDER — ONDANSETRON 2 MG/ML
INJECTION INTRAMUSCULAR; INTRAVENOUS
Status: COMPLETED
Start: 2021-09-23 | End: 2021-09-23

## 2021-09-23 RX ORDER — ONDANSETRON 2 MG/ML
4 INJECTION INTRAMUSCULAR; INTRAVENOUS ONCE
Status: COMPLETED | OUTPATIENT
Start: 2021-09-23 | End: 2021-09-23

## 2021-09-23 NOTE — ED PROVIDER NOTES
Patient Seen in: [facility] AND [facility] Emergency Department      History   Patient presents with:  Chest Pain Angina  Difficulty Breathing    Stated Complaint: shortness of breath, chest pain    Subjective:   HPI    79-year-old female presents the emergency 09/23/21 1355 Temporal   SpO2 09/23/21 1355 99 %   O2 Device 09/23/21 1524 None (Room air)       Current:/90   Pulse 51   Temp 97.1 °F (36.2 °C) (Temporal)   Resp 18   Ht 160 cm (5' 3\")   Wt 70.3 kg   LMP 07/21/2021   SpO2 98%   BMI 27.46 kg/m² Blood Urine Small (*)     RBC Urine 3-5 (*)     Bacteria Urine Rare (*)     Squamous Epi.  Cells Few (*)     All other components within normal limits   CBC W/ DIFFERENTIAL - Abnormal; Notable for the following components:    RDW-SD 47.9 (*)     All othe 9/23/2021  CONCLUSION:   No acute cardiopulmonary abnormality.     Dictated by (CST): Armando Jackson MD on 9/23/2021 at 3:03 PM     Finalized by (CST): Armando Jackson MD on 9/23/2021 at 3:05 PM                               MDM        Heart Score  0 points  Low

## 2021-09-24 ENCOUNTER — HOSPITAL ENCOUNTER (OUTPATIENT)
Dept: MAMMOGRAPHY | Facility: HOSPITAL | Age: 46
Discharge: HOME | End: 2021-09-24
Attending: FAMILY MEDICINE
Payer: COMMERCIAL

## 2021-09-24 ENCOUNTER — PATIENT MESSAGE (OUTPATIENT)
Dept: FAMILY MEDICINE CLINIC | Facility: CLINIC | Age: 46
End: 2021-09-24

## 2021-09-24 DIAGNOSIS — Z12.31 ENCOUNTER FOR SCREENING MAMMOGRAM FOR MALIGNANT NEOPLASM OF BREAST: ICD-10-CM

## 2021-09-24 PROCEDURE — 77067 SCR MAMMO BI INCL CAD: CPT | Performed by: FAMILY MEDICINE

## 2021-09-24 PROCEDURE — 77063 BREAST TOMOSYNTHESIS BI: CPT | Performed by: FAMILY MEDICINE

## 2021-12-23 ENCOUNTER — HOSPITAL ENCOUNTER (OUTPATIENT)
Age: 46
Discharge: HOME | End: 2021-12-23
Payer: COMMERCIAL

## 2021-12-23 VITALS
DIASTOLIC BLOOD PRESSURE: 76 MMHG | HEART RATE: 55 BPM | TEMPERATURE: 97 F | RESPIRATION RATE: 17 BRPM | OXYGEN SATURATION: 100 % | SYSTOLIC BLOOD PRESSURE: 138 MMHG

## 2021-12-23 DIAGNOSIS — Z20.822 ENCOUNTER FOR LABORATORY TESTING FOR COVID-19 VIRUS: Primary | ICD-10-CM

## 2021-12-23 DIAGNOSIS — Z20.822 LAB TEST NEGATIVE FOR COVID-19 VIRUS: ICD-10-CM

## 2021-12-23 PROCEDURE — 99213 OFFICE O/P EST LOW 20 MIN: CPT | Performed by: NURSE PRACTITIONER

## 2021-12-23 PROCEDURE — U0002 COVID-19 LAB TEST NON-CDC: HCPCS | Performed by: NURSE PRACTITIONER

## 2021-12-23 NOTE — ED PROVIDER NOTES
Patient Seen in: [facility]      History   Patient presents with:  Covid-19 Test  Runny Nose    Stated Complaint: daughter covid +/no symptoms    Subjective:   Andrew Magaña is a 42year-old female who presents to the immediate care for COViD 1301]   /76   Pulse 55   Resp 17   Temp 97.3 °F (36.3 °C)   Temp src Temporal   SpO2 100 %   O2 Device None (Room air)       Current:/76   Pulse 55   Temp 97.3 °F (36.3 °C) (Temporal)   Resp 17   LMP 07/21/2021   SpO2 100%         Physical Exam Time: 12/23/21 12:52 PM    Specimen: Nares; Other   Result Value Ref Range    Rapid SARS-CoV-2 by PCR Not Detected Not Detected         MDM   COVID negative. Discharge home. Advised quarantine per [employer] guidelines. May need repeat testing if symptoms occur.

## 2022-01-04 ENCOUNTER — NURSE ONLY (OUTPATIENT)
Dept: LAB | Facility: HOSPITAL | Age: 47
End: 2022-01-04
Attending: NURSE PRACTITIONER
Payer: COMMERCIAL

## 2022-01-04 ENCOUNTER — E-VISIT (OUTPATIENT)
Dept: TELEHEALTH | Age: 47
End: 2022-01-04

## 2022-01-04 DIAGNOSIS — Z20.822 ENCOUNTER BY TELEHEALTH FOR SUSPECTED COVID-19: Primary | ICD-10-CM

## 2022-01-04 DIAGNOSIS — Z20.822 ENCOUNTER BY TELEHEALTH FOR SUSPECTED COVID-19: ICD-10-CM

## 2022-01-04 PROCEDURE — 99421 OL DIG E/M SVC 5-10 MIN: CPT | Performed by: NURSE PRACTITIONER

## 2022-01-04 NOTE — PROGRESS NOTES
Patient requested an E-Visit. After reviewing history, symptoms and ordering laboratory testing, (7) minutes of time was accrued. Please see E-Visit for further information.

## 2022-01-07 LAB — SARS-COV-2 RNA RESP QL NAA+PROBE: DETECTED

## 2022-02-08 ENCOUNTER — IMMUNIZATION (OUTPATIENT)
Dept: LAB | Age: 47
End: 2022-02-08
Attending: EMERGENCY MEDICINE
Payer: COMMERCIAL

## 2022-02-08 ENCOUNTER — PATIENT MESSAGE (OUTPATIENT)
Dept: FAMILY MEDICINE CLINIC | Facility: CLINIC | Age: 47
End: 2022-02-08

## 2022-02-08 DIAGNOSIS — Z23 NEED FOR VACCINATION: Primary | ICD-10-CM

## 2022-02-08 PROCEDURE — 0064A SARSCOV2 VAC 50MCG/0.25ML IM: CPT

## 2022-02-09 NOTE — TELEPHONE ENCOUNTER
Call to patient re: c/o post Covid Booster immunization [facility]) side effects. States vaccine felt very different when being injected into skin/ muscle than prior covid vax injection. Initially stated extreme pain, erythema and edema at Injection site beginning 1 hr after 6p booster and becoming progressively worse through the evening was primary side effect complaint, requiring 4 Aleve tablets for pain relief. Early am today switched to Tylenol 4 tablets for arm pain relief, which has only marginally improved pain. Localized site icing and arm elevation not improving swelling much until this morning~ 9am. Pt now stating she also notes shoulder pain, swelling of face and breast/ axillary area, fatigue, nausea, dizziness. Same day appt offered and accepted by pt to evaluate covid arm/ other S/S vax reaction.

## 2022-04-28 NOTE — PROGRESS NOTES
08/06/18  PRESENTS AMBULATORY TO [facility]PM; ACCOMPANIED BY HER ;  NEW CONSULT C/O OF RT PELVIC PAIN RADIATING DOWN THE RLE; RATES 4/10;  CA STATES THE PAIN BEGAN IN FEB.  AND HAS INCREASED OVERTIME;  \"IT FEEL LIKE SOME ONE IS SQUEEZING ME\";  EXAMINED BY show

## 2022-05-11 ENCOUNTER — PATIENT MESSAGE (OUTPATIENT)
Dept: INTERNAL MEDICINE CLINIC | Facility: CLINIC | Age: 47
End: 2022-05-11

## 2022-05-12 ENCOUNTER — LAB ENCOUNTER (OUTPATIENT)
Dept: LAB | Age: 47
End: 2022-05-12
Attending: FAMILY MEDICINE
Payer: COMMERCIAL

## 2022-05-12 DIAGNOSIS — Z20.822 EXPOSURE TO COVID-19 VIRUS: ICD-10-CM

## 2022-05-12 LAB — SARS-COV-2 RNA RESP QL NAA+PROBE: NOT DETECTED

## 2022-05-12 NOTE — TELEPHONE ENCOUNTER
From: Ramona Rodriguez  To: Aimee Barfield MD  Sent: 5/11/2022 7:28 PM CDT  Subject: COVID 23 Exposure    My youngest son tested positive today. I've had a mild sore throat and overnight cough, but work in Healthcare. Will you please order a test for me to take first thing in the morning? I'm just a few blocks away from the Hospital[facility].

## 2022-11-09 ENCOUNTER — OFFICE VISIT (OUTPATIENT)
Dept: INTERNAL MEDICINE CLINIC | Facility: CLINIC | Age: 47
End: 2022-11-09
Payer: COMMERCIAL

## 2022-11-09 VITALS
TEMPERATURE: 98 F | HEIGHT: 63 IN | DIASTOLIC BLOOD PRESSURE: 71 MMHG | HEART RATE: 51 BPM | OXYGEN SATURATION: 99 % | WEIGHT: 153 LBS | SYSTOLIC BLOOD PRESSURE: 109 MMHG | BODY MASS INDEX: 27.11 KG/M2

## 2022-11-09 DIAGNOSIS — Z00.00 ANNUAL PHYSICAL EXAM: ICD-10-CM

## 2022-11-09 DIAGNOSIS — R10.2 PELVIC PAIN: Primary | ICD-10-CM

## 2022-11-09 DIAGNOSIS — R10.13 EPIGASTRIC PAIN: ICD-10-CM

## 2022-11-09 DIAGNOSIS — G43.019 INTRACTABLE MIGRAINE WITHOUT AURA AND WITHOUT STATUS MIGRAINOSUS: ICD-10-CM

## 2022-11-09 DIAGNOSIS — Z12.11 SCREENING FOR COLON CANCER: ICD-10-CM

## 2022-11-09 PROCEDURE — 99204 OFFICE O/P NEW MOD 45 MIN: CPT | Performed by: INTERNAL MEDICINE

## 2022-11-09 PROCEDURE — 3074F SYST BP LT 130 MM HG: CPT | Performed by: INTERNAL MEDICINE

## 2022-11-09 PROCEDURE — 3078F DIAST BP <80 MM HG: CPT | Performed by: INTERNAL MEDICINE

## 2022-11-09 PROCEDURE — 3008F BODY MASS INDEX DOCD: CPT | Performed by: INTERNAL MEDICINE

## 2022-11-09 RX ORDER — PANTOPRAZOLE SODIUM 40 MG/1
40 TABLET, DELAYED RELEASE ORAL
Qty: 30 TABLET | Refills: 0 | Status: SHIPPED | OUTPATIENT
Start: 2022-11-09

## 2022-11-10 NOTE — PATIENT INSTRUCTIONS
Epigastric pain / ? Nsaid induced gastritis  avoid nsaid / ibuprofen / aleve, naproxen - Careful with diet. Avoid hot spicy foods and caffeine and caffinated beverages. Careful with acidic foods.       Right side pelvic pain / chronic - referal for physiatry    Migraine follow up with  neurology

## 2023-01-31 ENCOUNTER — OFFICE VISIT (OUTPATIENT)
Facility: CLINIC | Age: 48
End: 2023-01-31

## 2023-01-31 ENCOUNTER — TELEPHONE (OUTPATIENT)
Facility: CLINIC | Age: 48
End: 2023-01-31

## 2023-01-31 VITALS
HEART RATE: 56 BPM | HEIGHT: 63 IN | WEIGHT: 151.31 LBS | SYSTOLIC BLOOD PRESSURE: 123 MMHG | DIASTOLIC BLOOD PRESSURE: 82 MMHG | BODY MASS INDEX: 26.81 KG/M2

## 2023-01-31 DIAGNOSIS — R10.84 GENERALIZED ABDOMINAL PAIN: ICD-10-CM

## 2023-01-31 DIAGNOSIS — Z12.11 COLON CANCER SCREENING: Primary | ICD-10-CM

## 2023-01-31 PROCEDURE — S0285 CNSLT BEFORE SCREEN COLONOSC: HCPCS | Performed by: STUDENT IN AN ORGANIZED HEALTH CARE EDUCATION/TRAINING PROGRAM

## 2023-01-31 PROCEDURE — 3074F SYST BP LT 130 MM HG: CPT | Performed by: STUDENT IN AN ORGANIZED HEALTH CARE EDUCATION/TRAINING PROGRAM

## 2023-01-31 PROCEDURE — 3079F DIAST BP 80-89 MM HG: CPT | Performed by: STUDENT IN AN ORGANIZED HEALTH CARE EDUCATION/TRAINING PROGRAM

## 2023-01-31 PROCEDURE — 3008F BODY MASS INDEX DOCD: CPT | Performed by: STUDENT IN AN ORGANIZED HEALTH CARE EDUCATION/TRAINING PROGRAM

## 2023-01-31 RX ORDER — ACETAMINOPHEN 500 MG
1000 TABLET ORAL EVERY 6 HOURS PRN
COMMUNITY

## 2023-01-31 RX ORDER — SOD SULF/POT CHLORIDE/MAG SULF 1.479 G
24 TABLET ORAL AS DIRECTED
Qty: 24 TABLET | Refills: 0 | Status: SHIPPED | OUTPATIENT
Start: 2023-01-31 | End: 2023-02-01

## 2023-01-31 NOTE — TELEPHONE ENCOUNTER
Scheduled for:  Colonoscopy [phone] ,[address]   Provider Name:  Lynne Davis  Date: 4/6/23   Location:  [facility]  Sedation:  Mac   Time: 0730 Am  (pt is aware that [facility] will call the day before to confirm arrival time)     Prep: Sutab or Split dose Golytely ,Egd  Prep instructions were given to pt in the office, pt verbalized understanding. Meds/Allergies Reconciled?:  Physician reviewed     Diagnosis with codes: Colon cancer screening Z12.11, Generalized abdominal pain R10.84  Was patient informed to call insurance with codes (Y/N):  Yes, I confirmed [employer] insurance with the patient. The patient also verbally understands to call her insurance to check for pre-cert, codes were given on prep instructions. Referral sent?:  Yes  [facility] or [facility] notified?: Electronic case request was sent to [facility] via [employer]. Medication Orders: This patient verbally confirmed that she is not taking:   Iron, blood thinners, BP meds, and is not diabetic   has latex allergy, Not PCN allergy and does not have a pacemaker Pt is aware to NOT take iron pills, herbal meds and diet supplements for 7 days before exam. Also to NOT take any form of alcohol, recreational drugs and any forms of ED meds 24 hours before exam.    Misc Orders:  Patient was informed that they will need a COVID 19 test prior to their procedure. Patient verbally understood & will await a phone call from [facility] to schedule.       Further instructions given by staff:

## 2023-01-31 NOTE — PATIENT INSTRUCTIONS
*Schedule CT Scan of the abdomen/pelvis    1. Schedule colonoscopy with [facility] [Diagnosis: screening] and EGD with [facility]    2.  bowel prep from pharmacy (split dose SUTAB or Golytely). For the week leading up to your colonoscopy start using miralax once per day. 3. Continue all medications for procedure    4. Read all bowel prep instructions carefully    5. AVOID seeds, nuts, popcorn, raw fruits and vegetables (cooked is okay) for 2-3 days before procedure    6. You MAY need to go for COVID testing 72 hours before procedure. The testing team will call you a few days before your procedure to discuss with you if testing is required. If you are asked to go for COVID testing and do not completed the test, the procedure cannot be performed. 7. If you start any NEW medication after your visit today, please notify us. Certain medications will need to be held before the procedure, or the procedure cannot be performed.

## 2023-03-19 ENCOUNTER — HOSPITAL ENCOUNTER (OUTPATIENT)
Dept: CT IMAGING | Facility: HOSPITAL | Age: 48
End: 2023-03-19
Attending: STUDENT IN AN ORGANIZED HEALTH CARE EDUCATION/TRAINING PROGRAM
Payer: COMMERCIAL

## 2023-03-19 ENCOUNTER — HOSPITAL ENCOUNTER (OUTPATIENT)
Dept: CT IMAGING | Facility: HOSPITAL | Age: 48
Discharge: HOME | End: 2023-03-19
Attending: STUDENT IN AN ORGANIZED HEALTH CARE EDUCATION/TRAINING PROGRAM
Payer: COMMERCIAL

## 2023-03-19 DIAGNOSIS — R10.84 GENERALIZED ABDOMINAL PAIN: ICD-10-CM

## 2023-03-19 LAB
CREAT BLD-MCNC: 1 MG/DL
GFR SERPLBLD BASED ON 1.73 SQ M-ARVRAT: 70 ML/MIN/1.73M2 (ref 60–?)

## 2023-03-19 PROCEDURE — 74177 CT ABD & PELVIS W/CONTRAST: CPT | Performed by: STUDENT IN AN ORGANIZED HEALTH CARE EDUCATION/TRAINING PROGRAM

## 2023-03-19 PROCEDURE — 82565 ASSAY OF CREATININE: CPT

## 2023-04-04 ENCOUNTER — TELEPHONE (OUTPATIENT)
Dept: GASTROENTEROLOGY | Facility: CLINIC | Age: 48
End: 2023-04-04

## 2023-04-06 ENCOUNTER — SURGERY CENTER DOCUMENTATION (OUTPATIENT)
Dept: SURGERY | Age: 48
End: 2023-04-06

## 2023-04-06 PROCEDURE — 43239 EGD BIOPSY SINGLE/MULTIPLE: CPT | Performed by: STUDENT IN AN ORGANIZED HEALTH CARE EDUCATION/TRAINING PROGRAM

## 2023-04-06 PROCEDURE — 45378 DIAGNOSTIC COLONOSCOPY: CPT | Performed by: STUDENT IN AN ORGANIZED HEALTH CARE EDUCATION/TRAINING PROGRAM

## 2023-04-11 ENCOUNTER — TELEPHONE (OUTPATIENT)
Facility: CLINIC | Age: 48
End: 2023-04-11

## 2023-04-12 ENCOUNTER — TELEPHONE (OUTPATIENT)
Facility: CLINIC | Age: 48
End: 2023-04-12

## 2023-04-12 DIAGNOSIS — K59.04 CHRONIC IDIOPATHIC CONSTIPATION: Primary | ICD-10-CM

## 2023-04-12 NOTE — TELEPHONE ENCOUNTER
I called Elisabeth Vidal and discussed her biopsy results. Her gastric biopsies were negative for H. pylori or any underlying inflammation. Continues to have issues of abdominal bloating and constipation. She has failed fiber, lactulose, Dulcolax, Colace, MiraLAX, magnesium products. Given that she has failed several over-the-counter medications we will trial Linzess 145 mcg daily. This was sent to her pharmacy and she will pick it up later today.

## 2023-08-07 ENCOUNTER — PATIENT MESSAGE (OUTPATIENT)
Dept: INTERNAL MEDICINE CLINIC | Facility: CLINIC | Age: 48
End: 2023-08-07

## 2023-08-08 NOTE — TELEPHONE ENCOUNTER
From: Ami Ramey  To: Earma Bernheim, MD  Sent: 8/7/2023 5:09 PM CDT  Subject: Rash/Bug Bite    I-ve had this rash since Saturday night. Its very difficult to tolerate the pain and irritaion. Is it possible to get a prescription for this? I've tried otc hydrocortisone cream with very little relief. I applied rubbing alcohol which hasn't helped much either.

## 2023-08-11 ENCOUNTER — OFFICE VISIT (OUTPATIENT)
Dept: INTERNAL MEDICINE CLINIC | Facility: CLINIC | Age: 48
End: 2023-08-11

## 2023-08-11 ENCOUNTER — NURSE TRIAGE (OUTPATIENT)
Dept: INTERNAL MEDICINE CLINIC | Facility: CLINIC | Age: 48
End: 2023-08-11

## 2023-08-11 VITALS
HEART RATE: 57 BPM | TEMPERATURE: 98 F | OXYGEN SATURATION: 98 % | WEIGHT: 162 LBS | HEIGHT: 63 IN | DIASTOLIC BLOOD PRESSURE: 72 MMHG | BODY MASS INDEX: 28.7 KG/M2 | SYSTOLIC BLOOD PRESSURE: 103 MMHG

## 2023-08-11 DIAGNOSIS — B02.8 HERPES ZOSTER WITH COMPLICATION: Primary | ICD-10-CM

## 2023-08-11 PROCEDURE — 3008F BODY MASS INDEX DOCD: CPT | Performed by: INTERNAL MEDICINE

## 2023-08-11 PROCEDURE — 99213 OFFICE O/P EST LOW 20 MIN: CPT | Performed by: INTERNAL MEDICINE

## 2023-08-11 PROCEDURE — 3074F SYST BP LT 130 MM HG: CPT | Performed by: INTERNAL MEDICINE

## 2023-08-11 PROCEDURE — 3078F DIAST BP <80 MM HG: CPT | Performed by: INTERNAL MEDICINE

## 2023-08-11 RX ORDER — VALACYCLOVIR HYDROCHLORIDE 1 G/1
1000 TABLET, FILM COATED ORAL 3 TIMES DAILY
Qty: 21 TABLET | Refills: 0 | Status: SHIPPED | OUTPATIENT
Start: 2023-08-11 | End: 2023-08-18

## 2023-08-11 RX ORDER — GABAPENTIN 100 MG/1
100 CAPSULE ORAL 3 TIMES DAILY
Qty: 90 CAPSULE | Refills: 0 | Status: SHIPPED | OUTPATIENT
Start: 2023-08-11 | End: 2023-08-14

## 2023-08-11 NOTE — TELEPHONE ENCOUNTER
Please reply to pool: EM RN TRIAGE  Action Requested: Summary for Provider     []  Critical Lab, Recommendations Needed  [] Need Additional Advice  [x]   FYI    []   Need Orders  [] Need Medications Sent to Pharmacy  []  Other     SUMMARY: Patient contacts clinic reporting rash to neck and ear. It is red, itchy and painful. Present x 3 days. Denies blisters, bleeding or drainage. Denies chest tightness, shortness of breath, lip or tongue swelling. Using hydrocortisone with no relief. Acute visit booked today.     Reason for call: Acute  Onset: Data Unavailable                       Reason for Disposition   SEVERE local itching persists after 2 days of steroid cream    Protocols used: Rash or Redness - [id number]

## 2023-08-11 NOTE — PROGRESS NOTES
Subjective:     Patient ID: Ami Ramey is a 57year old female. HPI    History/Other:   She came in today complaining of painful rash on her back of her neck. According to the patient is ongoing since Saturday. She woke up in the morning with this rash which was severely painful associated with some burning sensation. She does admit that lately she is more stressed out. She did try to put some mild corticosteroid cream over-the-counter did not help  Review of Systems   Constitutional: Negative. HENT: Negative. Respiratory: Negative. Cardiovascular: Negative. Gastrointestinal: Negative. Genitourinary: Negative. Musculoskeletal: Negative. Skin:  Positive for rash. Hematological: Negative. Psychiatric/Behavioral: Negative. Current Outpatient Medications   Medication Sig Dispense Refill    acetaminophen 500 MG Oral Tab Take 2 tablets (1,000 mg total) by mouth every 6 (six) hours as needed for Pain.      polyethylene glycol, PEG 3350-KCl-NaBcb-NaCl-NaSulf, 236 g Oral Recon Soln Take 4,000 mL by mouth As Directed. Take 2,000 mL the night before your procedure and 2,000 mL the morning of your procedure.  1 each 0    pantoprazole 40 MG Oral Tab EC Take 1 tablet (40 mg total) by mouth every morning before breakfast. (Patient not taking: Reported on 1/31/2023) 30 tablet 0     Allergies:  Latex                   RASH    Past Medical History:   Diagnosis Date    Constipation     Exposure to genital herpes     Migraine     Migraines     Pelvic pain in female     SINCE FEB/ 2018      Past Surgical History:   Procedure Laterality Date    LAPAROSCOPY,DIAGNOSTIC        Family History   Problem Relation Age of Onset    Hypertension Mother     Lipids Mother         hyperlipidemia    Other (Other) Mother     Cancer Cousin         leukemia    Cancer Paternal Uncle         unsure    Stroke Maternal Grandmother     Stroke Maternal Grandfather     No Known Problems Father     No Known Problems Daughter     No Known Problems Son     Obesity Sister     Other (Other) Brother         divirtuculitis      Social History:   Social History     Socioeconomic History    Marital status: Single   Tobacco Use    Smoking status: Former    Smokeless tobacco: Never    Tobacco comments:     around 21y/o; only for 1yr about 1-2 cigarettes per day   Vaping Use    Vaping Use: Never used   Substance and Sexual Activity    Alcohol use: Yes     Alcohol/week: 3.0 standard drinks of alcohol     Types: 3 Standard drinks or equivalent per week     Comment: socially    Drug use: No    Sexual activity: Yes     Birth control/protection: Vasectomy   Other Topics Concern    Caffeine Concern Yes     Comment: coffee 3 cups    Pt has a pacemaker No    Pt has a defibrillator No    Reaction to local anesthetic No        Objective:   Physical Exam  Vitals and nursing note reviewed. Constitutional:       Appearance: Normal appearance. HENT:      Head: Normocephalic and atraumatic. Cardiovascular:      Rate and Rhythm: Normal rate and regular rhythm. Pulses: Normal pulses. Pulmonary:      Effort: Pulmonary effort is normal.      Breath sounds: Normal breath sounds. Abdominal:      Palpations: Abdomen is soft. Musculoskeletal:      Cervical back: Normal range of motion and neck supple. Skin:     Findings: Rash present. Neurological:      Mental Status: She is alert. Mental status is at baseline. Assessment & Plan:   No diagnosis found. Shingles rash /will try valacyclovir, will give her gabapentin for neuralgia due to shingles, if not better  call us   No orders of the defined types were placed in this encounter.       Meds This Visit:  Requested Prescriptions      No prescriptions requested or ordered in this encounter       Imaging & Referrals:  None

## 2023-08-14 ENCOUNTER — TELEPHONE (OUTPATIENT)
Dept: INTERNAL MEDICINE CLINIC | Facility: CLINIC | Age: 48
End: 2023-08-14

## 2023-08-14 RX ORDER — GABAPENTIN 100 MG/1
300 CAPSULE ORAL 3 TIMES DAILY
Qty: 180 CAPSULE | Refills: 0 | Status: SHIPPED | OUTPATIENT
Start: 2023-08-14

## 2023-08-14 NOTE — TELEPHONE ENCOUNTER
She can take 200 mg 3 times a day if that does not help let us know I will send the new prescription for gabapentin

## 2023-08-14 NOTE — TELEPHONE ENCOUNTER
Patient states she has already been taking the Gabapentin 200mg TID for the past 3 days and no improvement. She would like to increase this dose or alternative stronger medication as she has not been able to sleep. Verified preferred pharmacy and patient allergies.

## 2023-08-14 NOTE — TELEPHONE ENCOUNTER
Action Requested: Summary for Provider     []  Critical Lab, Recommendations Needed  [x] Need Additional Advice  []   FYI    []   Need Orders  [x] Need Medications Sent to Pharmacy  []  Other     SUMMARY: Patient stated very minimal relief and is still having new break out, and is unable to sleep    Asking for more to be due, already taking gabapentin 200 mg TID, stated she was advised it was ok to do.     Will need new script for Gabapentin as well    Pharmacy verified     Please advise    Reason for call: Condition Update  Onset: Data Unavailable

## 2023-10-31 ENCOUNTER — PATIENT OUTREACH (OUTPATIENT)
Dept: CASE MANAGEMENT | Age: 48
End: 2023-10-31

## 2023-10-31 NOTE — PROCEDURES
The office order for PCP removal request is Approved and finalized on October 31, 2023.     Thanks,  [facility] [employer]

## 2024-02-07 ENCOUNTER — OFFICE VISIT (OUTPATIENT)
Dept: INTERNAL MEDICINE CLINIC | Facility: CLINIC | Age: 49
End: 2024-02-07
Payer: COMMERCIAL

## 2024-02-07 VITALS
OXYGEN SATURATION: 98 % | DIASTOLIC BLOOD PRESSURE: 76 MMHG | HEIGHT: 63 IN | HEART RATE: 54 BPM | WEIGHT: 155 LBS | TEMPERATURE: 98 F | SYSTOLIC BLOOD PRESSURE: 118 MMHG | BODY MASS INDEX: 27.46 KG/M2

## 2024-02-07 DIAGNOSIS — Z12.31 ENCOUNTER FOR SCREENING MAMMOGRAM FOR MALIGNANT NEOPLASM OF BREAST: ICD-10-CM

## 2024-02-07 DIAGNOSIS — E55.9 VITAMIN D DEFICIENCY: ICD-10-CM

## 2024-02-07 DIAGNOSIS — Z00.00 ANNUAL PHYSICAL EXAM: Primary | ICD-10-CM

## 2024-02-07 PROCEDURE — 99396 PREV VISIT EST AGE 40-64: CPT | Performed by: INTERNAL MEDICINE

## 2024-02-08 PROBLEM — R10.31 CHRONIC RLQ PAIN: Status: RESOLVED | Noted: 2018-12-14 | Resolved: 2024-02-08

## 2024-02-08 PROBLEM — G89.29 CHRONIC RLQ PAIN: Status: RESOLVED | Noted: 2018-12-14 | Resolved: 2024-02-08

## 2024-02-08 NOTE — PROGRESS NOTES
HPI:   Nelia Barahona is a 49 year old female who presents for a complete physical exam.      She is feeling tired  Wt Readings from Last 3 Encounters:   02/07/24 155 lb (70.3 kg)   08/11/23 162 lb (73.5 kg)   01/31/23 151 lb 4.8 oz (68.6 kg)     Body mass index is 27.46 kg/m².     Cholesterol, Total (mg/dL)   Date Value   02/11/2021 223 (H)     HDL Cholesterol (mg/dL)   Date Value   02/11/2021 63 (H)     LDL Cholesterol (mg/dL)   Date Value   02/11/2021 141 (H)     AST (U/L)   Date Value   02/11/2021 14 (L)   05/25/2018 16     ALT (U/L)   Date Value   02/11/2021 24   05/25/2018 10 (L)        Current Outpatient Medications   Medication Sig Dispense Refill    acetaminophen 500 MG Oral Tab Take 2 tablets (1,000 mg total) by mouth every 6 (six) hours as needed for Pain.      gabapentin 100 MG Oral Cap Take 3 capsules (300 mg total) by mouth 3 (three) times daily. (Patient not taking: Reported on 2/7/2024) 180 capsule 0    pantoprazole 40 MG Oral Tab EC Take 1 tablet (40 mg total) by mouth every morning before breakfast. (Patient not taking: Reported on 1/31/2023) 30 tablet 0      Past Medical History:   Diagnosis Date    Constipation     Exposure to genital herpes     Migraine     Migraines     Pelvic pain in female     SINCE FEB/ 2018      Past Surgical History:   Procedure Laterality Date    LAPAROSCOPY,DIAGNOSTIC        Family History   Problem Relation Age of Onset    Hypertension Mother     Lipids Mother         hyperlipidemia    Other (Other) Mother     Cancer Cousin         leukemia    Cancer Paternal Uncle         unsure    Stroke Maternal Grandmother     Stroke Maternal Grandfather     No Known Problems Father     No Known Problems Daughter     No Known Problems Son     Obesity Sister     Other (Other) Brother         divirtuculitis      Social History:   Social History     Socioeconomic History    Marital status: Single   Tobacco Use    Smoking status: Former    Smokeless tobacco: Never    Tobacco comments:      around 19y/o; only for 1yr about 1-2 cigarettes per day   Vaping Use    Vaping Use: Never used   Substance and Sexual Activity    Alcohol use: Yes     Alcohol/week: 3.0 standard drinks of alcohol     Types: 3 Standard drinks or equivalent per week     Comment: socially    Drug use: No    Sexual activity: Yes     Birth control/protection: Vasectomy   Other Topics Concern    Caffeine Concern Yes     Comment: coffee 3 cups    Pt has a pacemaker No    Pt has a defibrillator No    Reaction to local anesthetic No     Exercise:  twice per week.  Diet: watches calories closely     REVIEW OF SYSTEMS:     Constitutional Negative Chills, fatigue and fever.   ENMT Negative Hearing loss, nasal drainage, pain in/around ear, sinus pressure and sore throat.   Eyes Negative Eye pain and vision changes.   Respiratory Negative Cough, dyspnea and wheezing.   Cardio Negative Chest pain, claudication, edema and irregular heartbeat/palpitations.   GI Negative Abdominal pain, blood in stool, constipation, diarrhea, heartburn, nausea and vomiting.    Negative Dysuria, hematuria, urinary incontinence. Menses regular, not heavy.   Endocrine Negative Cold intolerance and heat intolerance.   Neuro Negative Dizziness, extremity weakness, headache, memory impairment, numbness in extremities, seizures and tremors.   Psych Negative Anxiety, depression and insomnia.   Integumentary Negative Breast discharge, breast lump(s), hair loss and rash.   MS Negative Back pain and joint pain.   Hema/Lymph Negative Easy bleeding, easy bruising and thromboembolic events.   Allergic/Immuno Negative Environmental allergies, food allergies and seasonal allergies.         EXAM:   /76 (BP Location: Right arm, Patient Position: Sitting, Cuff Size: adult)   Pulse 54   Temp 98.1 °F (36.7 °C) (Temporal)   Ht 5' 3\" (1.6 m)   Wt 155 lb (70.3 kg)   LMP 01/04/2024 (Approximate)   SpO2 98%   BMI 27.46 kg/m²   Body mass index is 27.46 kg/m².     Constitutional  Normal Well developed.   Eyes Normal Pupil - Right: Normal, Left: Normal.   Ears Normal External - normal. Canal. TM - Normal bilaterally  Hearing - grossly normal   Nasopharynx Normal External nose - Normal. Lips/teeth/gums - Normal. Oropharynx - clear no erythema or exudate   Neck Exam Normal Palpation - Normal. No thyromegaly or lymphadenopathy   Breast She is following  with ob    Respiratory Normal Auscultation - Normal, no wheezes or rales   Cardiovascular Normal Regular rate and rhythm. No murmurs, gallops, or rubs   Vascular Normal Pulses - Dorsalis pedis: Normal. Bruits - Carotids: Absent.    Extremity Normal No edema. No varicosities.   Abdomen Normal Inspection normal, BS active. No abdominal tenderness or masses palpable   Musculoskeletal Normal Overview - Normal. No swelling or deformities.   Skin Normal Inspection - Normal.   Neurological Normal Memory - Normal. Sensory - Normal. Motor - Normal. Balance & gait - Normal.   Psychiatric Normal Orientation - Oriented to time, place, person & situation. Appropriate mood and affect.          ASSESSMENT AND PLAN:   Nelia Barahona is a 49 year old female who presents for a complete physical exam.  Self breast exam explained.   Health maintenance: Patient is due for blood work  Screening  For breast cancer referral for mammogram  Pt' s weight is Body mass index is 27.46 kg/m²., recommended low fat diet and aerobic exercise 30 minutes three times weekly.  The patient indicates understanding of these issues and agrees to the plan.  The patient is asked to return for annual physical in 1 year.

## 2024-02-10 ENCOUNTER — LAB ENCOUNTER (OUTPATIENT)
Dept: LAB | Age: 49
End: 2024-02-10
Attending: INTERNAL MEDICINE
Payer: COMMERCIAL

## 2024-02-10 DIAGNOSIS — E55.9 VITAMIN D DEFICIENCY: ICD-10-CM

## 2024-02-10 DIAGNOSIS — Z00.00 ANNUAL PHYSICAL EXAM: ICD-10-CM

## 2024-02-10 LAB
ALBUMIN SERPL-MCNC: 4.5 G/DL (ref 3.2–4.8)
ALBUMIN/GLOB SERPL: 1.5 {RATIO} (ref 1–2)
ALP LIVER SERPL-CCNC: 75 U/L
ALT SERPL-CCNC: 9 U/L
ANION GAP SERPL CALC-SCNC: 7 MMOL/L (ref 0–18)
AST SERPL-CCNC: 19 U/L (ref ?–34)
BASOPHILS # BLD AUTO: 0.05 X10(3) UL (ref 0–0.2)
BASOPHILS NFR BLD AUTO: 1.1 %
BILIRUB SERPL-MCNC: 0.3 MG/DL (ref 0.3–1.2)
BUN BLD-MCNC: 10 MG/DL (ref 9–23)
BUN/CREAT SERPL: 10.3 (ref 10–20)
CALCIUM BLD-MCNC: 9.4 MG/DL (ref 8.7–10.4)
CHLORIDE SERPL-SCNC: 111 MMOL/L (ref 98–112)
CHOLEST SERPL-MCNC: 205 MG/DL (ref ?–200)
CO2 SERPL-SCNC: 27 MMOL/L (ref 21–32)
CREAT BLD-MCNC: 0.97 MG/DL
DEPRECATED RDW RBC AUTO: 45.1 FL (ref 35.1–46.3)
EGFRCR SERPLBLD CKD-EPI 2021: 73 ML/MIN/1.73M2 (ref 60–?)
EOSINOPHIL # BLD AUTO: 0.15 X10(3) UL (ref 0–0.7)
EOSINOPHIL NFR BLD AUTO: 3.3 %
ERYTHROCYTE [DISTWIDTH] IN BLOOD BY AUTOMATED COUNT: 12.9 % (ref 11–15)
FASTING PATIENT LIPID ANSWER: YES
FASTING STATUS PATIENT QL REPORTED: YES
GLOBULIN PLAS-MCNC: 3 G/DL (ref 2.8–4.4)
GLUCOSE BLD-MCNC: 105 MG/DL (ref 70–99)
HCT VFR BLD AUTO: 42.6 %
HDLC SERPL-MCNC: 58 MG/DL (ref 40–59)
HGB BLD-MCNC: 14 G/DL
IMM GRANULOCYTES # BLD AUTO: 0.01 X10(3) UL (ref 0–1)
IMM GRANULOCYTES NFR BLD: 0.2 %
LDLC SERPL CALC-MCNC: 132 MG/DL (ref ?–100)
LYMPHOCYTES # BLD AUTO: 2.14 X10(3) UL (ref 1–4)
LYMPHOCYTES NFR BLD AUTO: 47.3 %
MCH RBC QN AUTO: 31.3 PG (ref 26–34)
MCHC RBC AUTO-ENTMCNC: 32.9 G/DL (ref 31–37)
MCV RBC AUTO: 95.1 FL
MONOCYTES # BLD AUTO: 0.32 X10(3) UL (ref 0.1–1)
MONOCYTES NFR BLD AUTO: 7.1 %
NEUTROPHILS # BLD AUTO: 1.85 X10 (3) UL (ref 1.5–7.7)
NEUTROPHILS # BLD AUTO: 1.85 X10(3) UL (ref 1.5–7.7)
NEUTROPHILS NFR BLD AUTO: 41 %
NONHDLC SERPL-MCNC: 147 MG/DL (ref ?–130)
OSMOLALITY SERPL CALC.SUM OF ELEC: 299 MOSM/KG (ref 275–295)
PLATELET # BLD AUTO: 276 10(3)UL (ref 150–450)
POTASSIUM SERPL-SCNC: 4.4 MMOL/L (ref 3.5–5.1)
PROT SERPL-MCNC: 7.5 G/DL (ref 5.7–8.2)
RBC # BLD AUTO: 4.48 X10(6)UL
SODIUM SERPL-SCNC: 145 MMOL/L (ref 136–145)
TRIGL SERPL-MCNC: 84 MG/DL (ref 30–149)
TSI SER-ACNC: 1.25 MIU/ML (ref 0.55–4.78)
VIT B12 SERPL-MCNC: 566 PG/ML (ref 211–911)
VIT D+METAB SERPL-MCNC: 12.8 NG/ML (ref 30–100)
VLDLC SERPL CALC-MCNC: 15 MG/DL (ref 0–30)
WBC # BLD AUTO: 4.5 X10(3) UL (ref 4–11)

## 2024-02-10 PROCEDURE — 80061 LIPID PANEL: CPT

## 2024-02-10 PROCEDURE — 82607 VITAMIN B-12: CPT

## 2024-02-10 PROCEDURE — 36415 COLL VENOUS BLD VENIPUNCTURE: CPT

## 2024-02-10 PROCEDURE — 80053 COMPREHEN METABOLIC PANEL: CPT

## 2024-02-10 PROCEDURE — 82306 VITAMIN D 25 HYDROXY: CPT

## 2024-02-10 PROCEDURE — 84443 ASSAY THYROID STIM HORMONE: CPT

## 2024-02-10 PROCEDURE — 85025 COMPLETE CBC W/AUTO DIFF WBC: CPT

## 2024-02-12 ENCOUNTER — PATIENT MESSAGE (OUTPATIENT)
Dept: INTERNAL MEDICINE CLINIC | Facility: CLINIC | Age: 49
End: 2024-02-12

## 2024-02-12 NOTE — TELEPHONE ENCOUNTER
Jo Ann Medina, RN 2/12/2024 3:58 PM CST        ----- Message -----  From: Nelia Barahona  Sent: 2/12/2024 3:55 PM CST  To: Em Rn Triage  Subject: results     I don't eat fried often, maybe once or twice a month and hardly any red meat.

## 2024-03-13 ENCOUNTER — HOSPITAL ENCOUNTER (OUTPATIENT)
Dept: MAMMOGRAPHY | Age: 49
Discharge: HOME | End: 2024-03-13
Attending: INTERNAL MEDICINE
Payer: COMMERCIAL

## 2024-03-13 DIAGNOSIS — Z12.31 ENCOUNTER FOR SCREENING MAMMOGRAM FOR MALIGNANT NEOPLASM OF BREAST: ICD-10-CM

## 2024-03-13 PROCEDURE — 77067 SCR MAMMO BI INCL CAD: CPT | Performed by: INTERNAL MEDICINE

## 2024-03-13 PROCEDURE — 77063 BREAST TOMOSYNTHESIS BI: CPT | Performed by: INTERNAL MEDICINE

## 2024-04-11 ENCOUNTER — OFFICE VISIT (OUTPATIENT)
Dept: OBGYN CLINIC | Facility: CLINIC | Age: 49
End: 2024-04-11
Payer: COMMERCIAL

## 2024-04-11 VITALS — WEIGHT: 155 LBS | BODY MASS INDEX: 27.46 KG/M2 | HEIGHT: 63 IN

## 2024-04-11 DIAGNOSIS — Z01.419 WOMEN'S ANNUAL ROUTINE GYNECOLOGICAL EXAMINATION: ICD-10-CM

## 2024-04-11 DIAGNOSIS — Z11.3 SCREEN FOR STD (SEXUALLY TRANSMITTED DISEASE): ICD-10-CM

## 2024-04-11 DIAGNOSIS — Z12.4 SCREENING FOR CERVICAL CANCER: Primary | ICD-10-CM

## 2024-04-11 PROCEDURE — 99386 PREV VISIT NEW AGE 40-64: CPT | Performed by: STUDENT IN AN ORGANIZED HEALTH CARE EDUCATION/TRAINING PROGRAM

## 2024-04-11 RX ORDER — METFORMIN HYDROCHLORIDE 500 MG/1
TABLET, EXTENDED RELEASE ORAL
COMMUNITY
Start: 2024-04-03

## 2024-04-11 NOTE — PROGRESS NOTES
[facility]  Obstetrics and Gynecology  Annual  Dinora Dalton PA-C    Chief Complaint   Patient presents with    Gyn Exam     Annual exam       Nelia Barahona is a 49 year old female  presenting for her annual well woman exam. Patient's last menstrual period was 2024 (approximate). History of irregular menses will occasionally skip 2-3 months. Partner had a vasectomy. Sexually active with same partner. Desires STD screening. Her last pap smear was in 2018 with normal results. Recently completed this years screening mammogram. Denies any abnormal vaginal discharge, odor, irritation, or itching. No breast pain or masses. No dysuria or hematuria.     Pap:2018   Contraception:None  Mammo: 3/2024      OBSTETRICS HISTORY:     OB History    Para Term  AB Living   4 3 3   1 3   SAB IAB Ectopic Multiple Live Births     1            # Outcome Date GA Lbr Francis/2nd Weight Sex Type Anes PTL Lv   4 IAB            3 Term            2 Term            1 Term                GYNE HISTORY:     Menarche: 14 y/o  (2024  2:52 PM)  Period Cycle (Days): irregular (2024  2:52 PM)  Period Duration (Days): VARIES BETWEEN 4 weeks to  2 WEEKS  (2024  2:52 PM)  Period Flow: HEAVYX 3 DAYS  (2024  2:52 PM)  Use of Birth Control (if yes, specify type): Vasectomy (2024  2:52 PM)  Hx Prior Abnormal Pap: Yes (2024  2:52 PM)  Pap Date: 18 (2024  2:52 PM)  Pap Result Notes: Pap-Negative, Hpv-Negative,  (2024  2:52 PM)  Follow Up Recommendation: mammo 19 neg assessment      last annual 19 CAP (2024  2:52 PM)      History   Sexual Activity    Sexual activity: Yes    Birth control/ protection: Vasectomy           Latest Ref Rng & Units 2018     9:31 AM   RECENT PAP RESULTS   Thinprep Pap  Negative for intraepithelial lesion or malignancy    HPV Negative Negative          MEDICAL HISTORY:     Past Medical History:   Diagnosis Date    Acute sinusitis     Chronic RLQ pain      Constipation     Dermatitis     Exposure to genital herpes     Migraine     Migraines     Pelvic pain in female     SINCE FEB/ 2018      Past Surgical History:   Procedure Laterality Date    Laparoscopy,diagnostic         SOCIAL HISTORY:     Tobacco Use: Medium Risk (4/11/2024)    Patient History     Smoking Tobacco Use: Former     Smokeless Tobacco Use: Never     Passive Exposure: Not on file       Depression Screening:   Depression Screening (PHQ-2/PHQ-9): Over the LAST 2 WEEKS   Little interest or pleasure in doing things (over the last two weeks)?: Not at all    Feeling down, depressed, or hopeless (over the last two weeks)?: Not at all    PHQ-2 SCORE: 0          FAMILY HISTORY:     Family History   Problem Relation Age of Onset    Hypertension Mother     Lipids Mother         hyperlipidemia    Other (Other) Mother     Cancer Cousin         leukemia    Cancer Paternal Uncle         unsure    Stroke Maternal Grandmother     Stroke Maternal Grandfather     No Known Problems Father     No Known Problems Daughter     No Known Problems Son     Obesity Sister     Other (Other) Brother         divirtuculitis       MEDICATIONS:       Current Outpatient Medications:     metFORMIN  MG Oral Tablet 24 Hr, 1 tab in morning for 14 days, then 1 tab twice a day for 14 days, then 2 tab in morning and 1 tab in evening for 14 days, then 2 tab in morning and 2 tabs in evening (four tabs total) long term., Disp: , Rfl:     acetaminophen 500 MG Oral Tab, Take 2 tablets (1,000 mg total) by mouth every 6 (six) hours as needed for Pain., Disp: , Rfl:     ergocalciferol 1.25 MG (08828 UT) Oral Cap, Take 1 capsule (50,000 Units total) by mouth once a week. (Patient not taking: Reported on 4/11/2024), Disp: 12 capsule, Rfl: 0    gabapentin 100 MG Oral Cap, Take 3 capsules (300 mg total) by mouth 3 (three) times daily. (Patient not taking: Reported on 2/7/2024), Disp: 180 capsule, Rfl: 0    pantoprazole 40 MG Oral Tab EC, Take 1  tablet (40 mg total) by mouth every morning before breakfast. (Patient not taking: Reported on 1/31/2023), Disp: 30 tablet, Rfl: 0    ALLERGIES:       Allergies   Allergen Reactions    Latex RASH       REVIEW OF SYSTEMS:     Review of Systems   Constitutional:  Negative for chills, fever and unexpected weight change.   Respiratory: Negative.     Cardiovascular: Negative.    Gastrointestinal:  Negative for abdominal pain, constipation, diarrhea and nausea.   Genitourinary:  Negative for dyspareunia, dysuria, genital sores, hematuria, menstrual problem, pelvic pain, vaginal bleeding, vaginal discharge and vaginal pain.   Musculoskeletal: Negative.    Skin: Negative.    Neurological: Negative.    Hematological: Negative.    Psychiatric/Behavioral: Negative.           PHYSICAL EXAM:     Vitals:    04/11/24 1446   Weight: 155 lb (70.3 kg)   Height: 5' 3\" (1.6 m)       Body mass index is 27.46 kg/m².     Constitutional: well developed, well nourished  Psychiatric:  Oriented to time, place, person and situation. Appropriate mood and affect  Head/Face: normocephalic  Neck/Thyroid: thyroid symmetric, no thyromegaly, no nodules, no adenopathy  Lymphatic:no abnormal supraclavicular or axillary adenopathy is noted  Breast: normal without palpable masses, tenderness, asymmetry, nipple discharge, nipple retraction or skin changes  Abdomen:  soft, nontender, nondistended, no masses  Skin/Hair: no unusual rashes or bruises  Extremities: no edema, no cyanosis    Pelvic Exam:  External Genitalia: normal appearance, hair distribution, and no lesions  Urethral Meatus:  normal in size, location, without lesions and prolapse  Bladder:  No fullness, masses or tenderness  Vagina:  Normal appearance without lesions, no abnormal discharge  Cervix:  Normal without tenderness on motion  Uterus: normal in size, contour, position, mobility, without tenderness  Adnexa: normal without masses or tenderness  Perineum: normal  Anus: no hemorroids        ASSESSMENT:     Nelia was seen today for gyn exam.    Diagnoses and all orders for this visit:    Screening for cervical cancer  -     ThinPrep PAP Smear; Future  -     Hpv Dna  High Risk , Thin Prep Collect; Future    Women's annual routine gynecological examination  -     ThinPrep PAP Smear; Future  -     Hpv Dna  High Risk , Thin Prep Collect; Future    Screen for STD (sexually transmitted disease)  -     HCV Antibody; Future  -     Hepatitis B Surface Antigen; Future  -     HIV Ag/Ab Combo; Future  -     T Pallidum Screening Cascade; Future  -     Chlamydia/Gc Amplification; Future            PLAN:   Normal exam.  Pap smear done.   Recommend repeat pap smear with co-testing every 3 years for normal/ -HPV.  Recommend annual screening mammograms.   Recommend screening colonoscopy starting at 50 or earlier if significant family history or concerning symptoms.   Contraceptive Counseling as needed.   Maintain healthy lifestyle with well-balance diet and daily exercise.  Return to clinic in one year or as needed.        SUMMARY:  Pap: Next cotest 3-5 years per ASCCP guidelines.  BCM:  Vasectomy  STD screening: GC/Chl/Trich/HepB/HepC/HIV/RPR, condoms encouraged  Mammogram: done recently  HM updated    FOLLOW-UP     No follow-ups on file.    BARTOLO ELLINGTON PA-C  2:49 PM  4/11/2024    Note to patient and family:  The 21st Century Cures Act makes medical notes available to patients in the interest of transparency.  However, please be advised that this is a medical document.  It is intended as a peer to peer communication.  It is written in medical language and may contain abbreviations or verbiage that are technical and unfamiliar.  It may appear blunt or direct.  Medical documents are intended to carry relevant information, facts as evident, and the clinical opinion of the practitioner.

## 2024-04-12 LAB
C TRACH DNA SPEC QL NAA+PROBE: NEGATIVE
HPV I/H RISK 1 DNA SPEC QL NAA+PROBE: NEGATIVE
N GONORRHOEA DNA SPEC QL NAA+PROBE: NEGATIVE

## 2024-07-16 ENCOUNTER — PATIENT MESSAGE (OUTPATIENT)
Dept: OBGYN CLINIC | Facility: CLINIC | Age: 49
End: 2024-07-16

## 2024-07-17 RX ORDER — FLUCONAZOLE 150 MG/1
150 TABLET ORAL ONCE
Qty: 1 TABLET | Refills: 0 | Status: SHIPPED | OUTPATIENT
Start: 2024-07-17 | End: 2024-07-17

## 2024-07-17 NOTE — TELEPHONE ENCOUNTER
Patient reports: vaginal irritation, itching,  soreness, external pain, thick white vaginal discharge  Symptom onset: 1 days  Previously evaluated for symptoms: no   Treated per protocol: Diflucan 150 mg x1 dose  Advised of need for evaluation if symptoms persist after treatment. Verbalizes understanding and agrees with plan.   Allergies and pharmacy confirmed. Rx sent.

## 2024-10-24 ENCOUNTER — HOSPITAL ENCOUNTER (OUTPATIENT)
Age: 49
Discharge: HOME | End: 2024-10-24
Payer: COMMERCIAL

## 2024-10-24 ENCOUNTER — APPOINTMENT (OUTPATIENT)
Dept: ULTRASOUND IMAGING | Age: 49
End: 2024-10-24
Attending: NURSE PRACTITIONER
Payer: COMMERCIAL

## 2024-10-24 VITALS
DIASTOLIC BLOOD PRESSURE: 79 MMHG | HEART RATE: 58 BPM | TEMPERATURE: 98 F | RESPIRATION RATE: 18 BRPM | SYSTOLIC BLOOD PRESSURE: 124 MMHG | OXYGEN SATURATION: 100 %

## 2024-10-24 DIAGNOSIS — R10.2 PELVIC PAIN: Primary | ICD-10-CM

## 2024-10-24 LAB
B-HCG UR QL: NEGATIVE
BILIRUB UR QL STRIP: NEGATIVE
CLARITY UR: CLEAR
COLOR UR: YELLOW
GLUCOSE UR STRIP-MCNC: NEGATIVE MG/DL
KETONES UR STRIP-MCNC: NEGATIVE MG/DL
LEUKOCYTE ESTERASE UR QL STRIP: NEGATIVE
NITRITE UR QL STRIP: NEGATIVE
PH UR STRIP: 5.5 [PH]
PROT UR STRIP-MCNC: NEGATIVE MG/DL
SP GR UR STRIP: >=1.03
UROBILINOGEN UR STRIP-ACNC: <2 MG/DL

## 2024-10-24 PROCEDURE — 81002 URINALYSIS NONAUTO W/O SCOPE: CPT | Performed by: NURSE PRACTITIONER

## 2024-10-24 PROCEDURE — 81025 URINE PREGNANCY TEST: CPT | Performed by: NURSE PRACTITIONER

## 2024-10-24 PROCEDURE — 99213 OFFICE O/P EST LOW 20 MIN: CPT | Performed by: NURSE PRACTITIONER

## 2024-10-24 PROCEDURE — 76830 TRANSVAGINAL US NON-OB: CPT | Performed by: NURSE PRACTITIONER

## 2024-10-24 PROCEDURE — 76856 US EXAM PELVIC COMPLETE: CPT | Performed by: NURSE PRACTITIONER

## 2024-10-24 PROCEDURE — 93975 VASCULAR STUDY: CPT | Performed by: NURSE PRACTITIONER

## 2024-10-24 NOTE — DISCHARGE INSTRUCTIONS
Please continue to take ibuprofen as needed for pain.  Heating pad to the area.  Close follow-up with either your primary care provider or gynecology as recommended.  We have sent for cultures notify you in 2 to 3 days of results.  Any worsening symptoms please go to the ER.

## 2024-10-24 NOTE — ED INITIAL ASSESSMENT (HPI)
Pt c/o low back pain and R pelvic pain intermittent for years, worse today. States had a gush of \"yellowish-orange tissue\" today.  No fever.  No urinary symptoms.

## 2024-10-24 NOTE — ED PROVIDER NOTES
Patient Seen in: [facility]      History     Chief Complaint   Patient presents with    Eval-G     Stated Complaint: Eval G  Subjective:   HPI    This is a well-appearing 49-year-old female with a history of chronic pelvic pain who presents with right-sided pelvic pain and vaginal discharge.  Patient states that today she had her normal pelvic pain which she normally has.  States she stood up and had a yellow/orange vaginal discharge.  That concerned her which prompted the visit today.  No dysuria, urgency or frequency.  Not overly concerned for STIs.  Denies any pain than what she normally has.      Objective:   Past Medical History:    Acute sinusitis    Chronic RLQ pain    Constipation    Dermatitis    Exposure to genital herpes    Migraine    Migraines    Pelvic pain in female    SINCE FEB/ 2018            Past Surgical History:   Procedure Laterality Date    Laparoscopy,diagnostic                Social History     Socioeconomic History    Marital status: Single   Tobacco Use    Smoking status: Former    Smokeless tobacco: Never    Tobacco comments:     around 21y/o; only for 1yr about 1-2 cigarettes per day   Vaping Use    Vaping status: Never Used   Substance and Sexual Activity    Alcohol use: Yes     Alcohol/week: 3.0 standard drinks of alcohol     Types: 3 Standard drinks or equivalent per week     Comment: socially    Drug use: No    Sexual activity: Yes     Birth control/protection: Vasectomy   Other Topics Concern    Caffeine Concern Yes     Comment: coffee 3 cups    Pt has a pacemaker No    Pt has a defibrillator No    Reaction to local anesthetic No            Review of Systems    Positive for stated complaint: Eval-G    Other systems are as noted in HPI.  Constitutional and vital signs reviewed.      All other systems reviewed and negative except as noted above.    Physical Exam     ED Triage Vitals   BP 10/24/24 1349 124/79   Pulse 10/24/24 1349 58   Resp 10/24/24 1349 18   Temp 10/24/24  1349 97.6 °F (36.4 °C)   Temp src 10/24/24 1349 Temporal   SpO2 10/24/24 1349 100 %   O2 Device 10/24/24 1343 None (Room air)     Current:/79   Pulse 58   Temp 97.6 °F (36.4 °C) (Temporal)   Resp 18   LMP 08/04/2024 (Approximate)   SpO2 100%     Physical Exam  Vitals and nursing note reviewed. Exam conducted with a chaperone present (MARY Brennan).   Constitutional:       General: She is awake. She is not in acute distress.     Appearance: Normal appearance. She is not ill-appearing, toxic-appearing or diaphoretic.   HENT:      Head: Normocephalic and atraumatic.      Right Ear: Tympanic membrane, ear canal and external ear normal.      Left Ear: Tympanic membrane, ear canal and external ear normal.      Nose: Nose normal.      Mouth/Throat:      Mouth: Mucous membranes are moist.      Pharynx: Oropharynx is clear. Uvula midline.   Eyes:      General: Lids are normal.      Extraocular Movements: Extraocular movements intact.      Conjunctiva/sclera: Conjunctivae normal.      Pupils: Pupils are equal, round, and reactive to light.   Cardiovascular:      Rate and Rhythm: Normal rate and regular rhythm.      Pulses: Normal pulses.      Heart sounds: Normal heart sounds.   Pulmonary:      Effort: Pulmonary effort is normal.      Breath sounds: Normal breath sounds and air entry. No stridor, decreased air movement or transmitted upper airway sounds.   Abdominal:      Tenderness: There is abdominal tenderness in the suprapubic area.   Genitourinary:     General: Normal vulva.      Exam position: Knee-chest position.      Vagina: Normal.      Adnexa: Left adnexa normal.        Right: Tenderness present.       Comments: Scant amount of white discharge   Skin:     General: Skin is warm and dry.      Capillary Refill: Capillary refill takes less than 2 seconds.   Neurological:      General: No focal deficit present.      Mental Status: She is alert and oriented to person, place, and time.   Psychiatric:         Mood  and Affect: Mood normal.         Behavior: Behavior normal. Behavior is cooperative.         Thought Content: Thought content normal.         Judgment: Judgment normal.       ED Course   GC/Chlamydia, Vaginitis and re-evaluate   Urine reviewed, small amount of blood, otherwise unremarkable.  Pregnancy negative.  Pelvic exam completed, GC/committee and genital vaginosis screening sent.  Awaiting ultrasound.  Ultrasound reviewed, unremarkable pelvic ultrasound with color flow present to both ovaries.  US PELVIS EV W DOPPLER (CPT=76856/55211/61283)    Result Date: 10/24/2024  CONCLUSION:  Unremarkable pelvic ultrasound with color flow present to both ovaries.    Dictated by (CST): Chavo Jovel MD on 10/24/2024 at 3:22 PM     Finalized by (CST): Chavo Jovel MD on 10/24/2024 at 3:23 PM          US PELVIS EV W DOPPLER (CPT=76856/01646/75686)    Result Date: 10/24/2024  CONCLUSION:  Unremarkable pelvic ultrasound with color flow present to both ovaries.    Dictated by (CST): Chavo Jovel MD on 10/24/2024 at 3:22 PM     Finalized by (CST): Chavo Jovel MD on 10/24/2024 at 3:23 PM         Labs Reviewed   [facility] POCT URINALYSIS DIPSTICK - Abnormal; Notable for the following components:       Result Value    Blood, Urine Small (*)     All other components within normal limits   POCT PREGNANCY URINE - Normal   CHLAMYDIA/GONOCOCCUS, MARY LOU   VAGINITIS VAGINOSIS PCR PANEL       MDM     Medical Decision Making  Differential diagnoses reflecting the complexity of care include but are not limited to cystitis, ovarian torsion, ovarian cyst, PID, STI, chronic pelvic pain.    Comorbidities that add complexity to management include: Chronic pelvic pain  History obtained by an independent source was from: N/A  Discussions of management was done with: N/A  My independent interpretations of studies include: Urinalysis, unremarkable.  Pregnancy negative.  Ultrasound reviewed, unremarkable.  Shared decision making was done by:  Patient and myself  Patient is well appearing, non-toxic and in no acute distress.  Vital signs are stable.  Discussed with patient the ultrasound findings.  GC/chlamydia in addition to genital vaginosis screening sent.  I did discuss with the patient close follow-up with her gynecologist and recommended.  This is her chronic pain, unknown etiology of vaginal discharge that she had.  Scant amount here on pelvic exam.  I did discuss with the patient the need for close follow-up and strict ER precautions.  Patient verbalized plan of care and stated understanding    Problems Addressed:  Pelvic pain: acute illness or injury    Amount and/or Complexity of Data Reviewed  Radiology: ordered and independent interpretation performed. Decision-making details documented in ED Course.  ECG/medicine tests: ordered and independent interpretation performed. Decision-making details documented in ED Course.    Risk  OTC drugs.        Disposition and Plan     Clinical Impression:  1. Pelvic pain         Disposition:  Discharge  10/24/2024  3:27 pm    Follow-up:  Maritza Regalado MD  [address] [place] [id number]  [phone]                Medications Prescribed:  Discharge Medication List as of 10/24/2024  3:29 PM             Note to patient: The [employer] act makes medical notes like these available to patients in the interest of transparency.  However, be advised this medical document and is intended as peer to peer communication.  It is read the medical language and may contain abbreviations or verbiage that are unfamiliar.  It may appear blunt or direct.  Medical documents are intended to carry relevant information, fax is evident and the clinical opinion of the practitioner.    This note was prepared using Dragon Medical voice recognition dictation software.  As a result, errors may occur.  When identified, these errors have been corrected.  While every attempt is made to correct errors during dictation,  discrepancies may still exist.    Hemalatha White, APRN  10/24/2024  1:59 PM

## 2024-10-25 LAB
BV BACTERIA DNA VAG QL NAA+PROBE: NEGATIVE
C GLABRATA DNA VAG QL NAA+PROBE: NEGATIVE
C KRUSEI DNA VAG QL NAA+PROBE: NEGATIVE
C TRACH DNA SPEC QL NAA+PROBE: NEGATIVE
CANDIDA DNA VAG QL NAA+PROBE: POSITIVE
N GONORRHOEA DNA SPEC QL NAA+PROBE: NEGATIVE
T VAGINALIS DNA VAG QL NAA+PROBE: NEGATIVE

## 2024-10-25 RX ORDER — FLUCONAZOLE 150 MG/1
150 TABLET ORAL ONCE
Qty: 1 TABLET | Refills: 0 | Status: SHIPPED | OUTPATIENT
Start: 2024-10-25 | End: 2024-10-25

## 2024-11-21 NOTE — ED INITIAL ASSESSMENT (HPI)
Chest pain/tightness, shortness of breath x1 day. Reports feeling hot but did not take temperature. +Dizziness. +Nausea. Diagnosis: finger infection    Follow up with your primary care doctor in the next 3 days for an evaluation.  If you develop fevers, worsening pain, worsening redness then return to the emergency department.  Continue your current antibiotics until you run out.    Tests today showed:   Labs Reviewed   CBC W/ AUTO DIFFERENTIAL - Abnormal       Result Value    WBC 6.85      RBC 4.63      Hemoglobin 15.2      Hematocrit 45.2      MCV 98      MCH 32.8 (*)     MCHC 33.6      RDW 12.7      Platelets 234      MPV 10.5      Immature Granulocytes 0.3      Gran # (ANC) 4.9      Immature Grans (Abs) 0.02      Lymph # 1.3      Mono # 0.6      Eos # 0.0      Baso # 0.03      nRBC 0      Gran % 71.7      Lymph % 19.0      Mono % 8.0      Eosinophil % 0.6      Basophil % 0.4      Differential Method Automated     COMPREHENSIVE METABOLIC PANEL - Abnormal    Sodium 140      Potassium 4.0      Chloride 109      CO2 22 (*)     Glucose 118 (*)     BUN 21      Creatinine 1.2      Calcium 9.4      Total Protein 7.5      Albumin 4.2      Total Bilirubin 0.5      Alkaline Phosphatase 54      AST 22      ALT 21      eGFR >60      Anion Gap 9     C-REACTIVE PROTEIN - Abnormal    CRP 9.3 (*)      No orders to display       Treatments you had today:   Medications   LIDOcaine HCL 10 mg/ml (1%) injection 5 mL (5 mLs Infiltration Given by Other 11/21/24 6068)       Follow-Up Plan:  - Follow-up with primary care doctor within 3 - 5 days  - Additional testing and/or evaluation as directed by your primary doctor    Return to the Emergency Department for symptoms including but not limited to: worsening symptoms, shortness of breath or chest pain, vomiting with inability to hold down fluids, fevers greater than 100.4°F, passing out/fainting/unconsciousness, or other concerning symptoms.

## 2025-04-16 ENCOUNTER — OFFICE VISIT (OUTPATIENT)
Dept: INTERNAL MEDICINE CLINIC | Facility: CLINIC | Age: 50
End: 2025-04-16
Payer: COMMERCIAL

## 2025-04-16 VITALS
HEIGHT: 63 IN | WEIGHT: 162 LBS | OXYGEN SATURATION: 98 % | SYSTOLIC BLOOD PRESSURE: 117 MMHG | BODY MASS INDEX: 28.7 KG/M2 | HEART RATE: 68 BPM | DIASTOLIC BLOOD PRESSURE: 73 MMHG

## 2025-04-16 DIAGNOSIS — Z00.00 ANNUAL PHYSICAL EXAM: Primary | ICD-10-CM

## 2025-04-16 DIAGNOSIS — Z12.31 ENCOUNTER FOR SCREENING MAMMOGRAM FOR MALIGNANT NEOPLASM OF BREAST: ICD-10-CM

## 2025-04-16 DIAGNOSIS — E55.9 VITAMIN D DEFICIENCY: ICD-10-CM

## 2025-04-16 PROCEDURE — 99396 PREV VISIT EST AGE 40-64: CPT | Performed by: INTERNAL MEDICINE

## 2025-04-16 PROCEDURE — 90715 TDAP VACCINE 7 YRS/> IM: CPT | Performed by: INTERNAL MEDICINE

## 2025-04-16 PROCEDURE — 90471 IMMUNIZATION ADMIN: CPT | Performed by: INTERNAL MEDICINE

## 2025-04-16 NOTE — PROGRESS NOTES
HPI:   Nelia Barahona is a 50 year old female who presents for a complete physical exam.      Wt Readings from Last 3 Encounters:   04/16/25 162 lb (73.5 kg)   04/11/24 155 lb (70.3 kg)   02/07/24 155 lb (70.3 kg)     Body mass index is 28.7 kg/m².     Cholesterol, Total (mg/dL)   Date Value   02/10/2024 205 (H)   02/11/2021 223 (H)     HDL Cholesterol (mg/dL)   Date Value   02/10/2024 58   02/11/2021 63 (H)     LDL Cholesterol (mg/dL)   Date Value   02/10/2024 132 (H)   02/11/2021 141 (H)     AST (U/L)   Date Value   02/10/2024 19   02/11/2021 14 (L)   05/25/2018 16     ALT (U/L)   Date Value   02/10/2024 9 (L)   02/11/2021 24   05/25/2018 10 (L)        Current Medications[1]   Past Medical History[2]   Past Surgical History[3]   Family History[4]   Social History:   Short Social Hx on File[5]  Exercise: none.  Diet: watches minimally     REVIEW OF SYSTEMS:     Constitutional Negative Chills, fatigue and fever.   ENMT Negative Hearing loss, nasal drainage, pain in/around ear, sinus pressure and sore throat.   Eyes Negative Eye pain and vision changes.   Respiratory Negative Cough, dyspnea and wheezing.   Cardio Negative Chest pain, claudication, edema and irregular heartbeat/palpitations.   GI Negative Abdominal pain, blood in stool, constipation, diarrhea, heartburn, nausea and vomiting.    Negative Dysuria, hematuria, urinary incontinence. Menses regular, not heavy.   Endocrine Negative Cold intolerance and heat intolerance.   Neuro Negative Dizziness, extremity weakness, headache, memory impairment, numbness in extremities, seizures and tremors.   Psych Negative Anxiety, depression and insomnia.   Integumentary Negative Breast discharge, breast lump(s), hair loss and rash.   MS Negative Back pain and joint pain.   Hema/Lymph Negative Easy bleeding, easy bruising and thromboembolic events.   Allergic/Immuno Negative Environmental allergies, food allergies and seasonal allergies.         EXAM:   /73 (BP  Location: Left arm, Patient Position: Sitting, Cuff Size: adult)   Pulse 68   Ht 5' 3\" (1.6 m)   Wt 162 lb (73.5 kg)   LMP 08/04/2024 (Approximate)   SpO2 98%   BMI 28.70 kg/m²   Body mass index is 28.7 kg/m².     Constitutional Normal Well developed.   Eyes Normal Pupil - Right: Normal, Left: Normal.   Ears Normal External - normal. Canal. TM - Normal bilaterally  Hearing - grossly normal   Nasopharynx Normal External nose - Normal. Lips/teeth/gums - Normal. Oropharynx - clear no erythema or exudate   Neck Exam Normal Palpation - Normal. No thyromegaly or lymphadenopathy   Breast Normal Inspection, palpation, nipples normal bilaterally. Self breast exam has been taught. Patient performs self breast exam.   Respiratory Normal Auscultation - Normal, no wheezes or rales   Cardiovascular Normal Regular rate and rhythm. No murmurs, gallops, or rubs   Vascular Normal Pulses - Dorsalis pedis: Normal. Bruits - Carotids: Absent.    Extremity Normal No edema. No varicosities.   Abdomen Normal Inspection normal, BS active. No abdominal tenderness or masses palpable   Musculoskeletal Normal Overview - Normal. No swelling or deformities.   Skin Normal Inspection - Normal.   Neurological Normal Memory - Normal. Sensory - Normal. Motor - Normal. Balance & gait - Normal.   Psychiatric Normal Orientation - Oriented to time, place, person & situation. Appropriate mood and affect.          ASSESSMENT AND PLAN:   Nelia Barahona is a 50 year old female who presents for a complete physical exam.  Self breast exam explained.   Health maintenance: Patient is due for  blood work    Tdep   Screeinng for breast cancer - mammogram  Pt' s weight is Body mass index is 28.7 kg/m²., recommended low fat diet and aerobic exercise 30 minutes three times weekly.  The patient indicates understanding of these issues and agrees to the plan.  The patient is asked to return for annual physical in 1 year.           [1]   Current Outpatient Medications    Medication Sig Dispense Refill    metFORMIN  MG Oral Tablet 24 Hr 1 tab in morning for 14 days, then 1 tab twice a day for 14 days, then 2 tab in morning and 1 tab in evening for 14 days, then 2 tab in morning and 2 tabs in evening (four tabs total) long term. (Patient not taking: Reported on 4/16/2025)      ergocalciferol 1.25 MG (59907 UT) Oral Cap Take 1 capsule (50,000 Units total) by mouth once a week. (Patient not taking: Reported on 4/16/2025) 12 capsule 0    gabapentin 100 MG Oral Cap Take 3 capsules (300 mg total) by mouth 3 (three) times daily. (Patient not taking: Reported on 2/7/2024) 180 capsule 0    acetaminophen 500 MG Oral Tab Take 2 tablets (1,000 mg total) by mouth every 6 (six) hours as needed for Pain. (Patient not taking: Reported on 4/16/2025)      pantoprazole 40 MG Oral Tab EC Take 1 tablet (40 mg total) by mouth every morning before breakfast. (Patient not taking: Reported on 4/16/2025) 30 tablet 0   [2]   Past Medical History:   Acute sinusitis    Chronic RLQ pain    Constipation    Dermatitis    Exposure to genital herpes    Migraine    Migraines    Pelvic pain in female    SINCE FEB/ 2018   [3]   Past Surgical History:  Procedure Laterality Date    Laparoscopy,diagnostic     [4]   Family History  Problem Relation Age of Onset    Hypertension Mother     Lipids Mother         hyperlipidemia    Other (Other) Mother     Cancer Cousin         leukemia    Cancer Paternal Uncle         unsure    Stroke Maternal Grandmother     Stroke Maternal Grandfather     No Known Problems Father     No Known Problems Daughter     No Known Problems Son     Obesity Sister     Other (Other) Brother         divirtuculitis   [5]   Social History  Socioeconomic History    Marital status: Single   Tobacco Use    Smoking status: Former    Smokeless tobacco: Never    Tobacco comments:     around 19y/o; only for 1yr about 1-2 cigarettes per day   Vaping Use    Vaping status: Never Used   Substance and Sexual  Activity    Alcohol use: Yes     Alcohol/week: 3.0 standard drinks of alcohol     Types: 3 Standard drinks or equivalent per week     Comment: socially    Drug use: No    Sexual activity: Yes     Birth control/protection: Vasectomy   Other Topics Concern    Caffeine Concern Yes     Comment: coffee 3 cups    Pt has a pacemaker No    Pt has a defibrillator No    Reaction to local anesthetic No

## 2025-04-22 ENCOUNTER — LAB ENCOUNTER (OUTPATIENT)
Dept: LAB | Facility: HOSPITAL | Age: 50
End: 2025-04-22
Attending: INTERNAL MEDICINE
Payer: COMMERCIAL

## 2025-04-22 DIAGNOSIS — E55.9 VITAMIN D DEFICIENCY: ICD-10-CM

## 2025-04-22 DIAGNOSIS — Z00.00 ANNUAL PHYSICAL EXAM: ICD-10-CM

## 2025-04-22 LAB
ALBUMIN SERPL-MCNC: 4.6 G/DL (ref 3.2–4.8)
ALBUMIN/GLOB SERPL: 1.8 {RATIO} (ref 1–2)
ALP LIVER SERPL-CCNC: 80 U/L (ref 39–100)
ALT SERPL-CCNC: 24 U/L (ref 10–49)
ANION GAP SERPL CALC-SCNC: 7 MMOL/L (ref 0–18)
AST SERPL-CCNC: 18 U/L (ref ?–34)
BASOPHILS # BLD AUTO: 0.04 X10(3) UL (ref 0–0.2)
BASOPHILS NFR BLD AUTO: 1 %
BILIRUB SERPL-MCNC: 0.5 MG/DL (ref 0.3–1.2)
BUN BLD-MCNC: 17 MG/DL (ref 9–23)
BUN/CREAT SERPL: 16.5 (ref 10–20)
CALCIUM BLD-MCNC: 9.4 MG/DL (ref 8.7–10.4)
CHLORIDE SERPL-SCNC: 107 MMOL/L (ref 98–112)
CHOLEST SERPL-MCNC: 238 MG/DL (ref ?–200)
CO2 SERPL-SCNC: 26 MMOL/L (ref 21–32)
CREAT BLD-MCNC: 1.03 MG/DL (ref 0.55–1.02)
DEPRECATED RDW RBC AUTO: 42.9 FL (ref 35.1–46.3)
EGFRCR SERPLBLD CKD-EPI 2021: 67 ML/MIN/1.73M2 (ref 60–?)
EOSINOPHIL # BLD AUTO: 0.16 X10(3) UL (ref 0–0.7)
EOSINOPHIL NFR BLD AUTO: 4.1 %
ERYTHROCYTE [DISTWIDTH] IN BLOOD BY AUTOMATED COUNT: 12.5 % (ref 11–15)
EST. AVERAGE GLUCOSE BLD GHB EST-MCNC: 128 MG/DL (ref 68–126)
FASTING PATIENT LIPID ANSWER: YES
FASTING STATUS PATIENT QL REPORTED: YES
GLOBULIN PLAS-MCNC: 2.6 G/DL (ref 2–3.5)
GLUCOSE BLD-MCNC: 105 MG/DL (ref 70–99)
HBA1C MFR BLD: 6.1 % (ref ?–5.7)
HCT VFR BLD AUTO: 41.2 % (ref 35–48)
HDLC SERPL-MCNC: 53 MG/DL (ref 40–59)
HGB BLD-MCNC: 13.4 G/DL (ref 12–16)
IMM GRANULOCYTES # BLD AUTO: 0.01 X10(3) UL (ref 0–1)
IMM GRANULOCYTES NFR BLD: 0.3 %
LDLC SERPL CALC-MCNC: 161 MG/DL (ref ?–100)
LYMPHOCYTES # BLD AUTO: 1.99 X10(3) UL (ref 1–4)
LYMPHOCYTES NFR BLD AUTO: 51.2 %
MCH RBC QN AUTO: 30 PG (ref 26–34)
MCHC RBC AUTO-ENTMCNC: 32.5 G/DL (ref 31–37)
MCV RBC AUTO: 92.2 FL (ref 80–100)
MONOCYTES # BLD AUTO: 0.35 X10(3) UL (ref 0.1–1)
MONOCYTES NFR BLD AUTO: 9 %
NEUTROPHILS # BLD AUTO: 1.34 X10 (3) UL (ref 1.5–7.7)
NEUTROPHILS # BLD AUTO: 1.34 X10(3) UL (ref 1.5–7.7)
NEUTROPHILS NFR BLD AUTO: 34.4 %
NONHDLC SERPL-MCNC: 185 MG/DL (ref ?–130)
OSMOLALITY SERPL CALC.SUM OF ELEC: 292 MOSM/KG (ref 275–295)
PLATELET # BLD AUTO: 283 10(3)UL (ref 150–450)
POTASSIUM SERPL-SCNC: 4.3 MMOL/L (ref 3.5–5.1)
PROT SERPL-MCNC: 7.2 G/DL (ref 5.7–8.2)
RBC # BLD AUTO: 4.47 X10(6)UL (ref 3.8–5.3)
SODIUM SERPL-SCNC: 140 MMOL/L (ref 136–145)
TRIGL SERPL-MCNC: 133 MG/DL (ref 30–149)
TSI SER-ACNC: 1.43 UIU/ML (ref 0.55–4.78)
VIT D+METAB SERPL-MCNC: 20.9 NG/ML (ref 30–100)
VLDLC SERPL CALC-MCNC: 26 MG/DL (ref 0–30)
WBC # BLD AUTO: 3.9 X10(3) UL (ref 4–11)

## 2025-04-22 PROCEDURE — 82306 VITAMIN D 25 HYDROXY: CPT

## 2025-04-22 PROCEDURE — 85025 COMPLETE CBC W/AUTO DIFF WBC: CPT

## 2025-04-22 PROCEDURE — 83036 HEMOGLOBIN GLYCOSYLATED A1C: CPT

## 2025-04-22 PROCEDURE — 36415 COLL VENOUS BLD VENIPUNCTURE: CPT

## 2025-04-22 PROCEDURE — 80061 LIPID PANEL: CPT

## 2025-04-22 PROCEDURE — 84443 ASSAY THYROID STIM HORMONE: CPT

## 2025-04-22 PROCEDURE — 80053 COMPREHEN METABOLIC PANEL: CPT

## 2025-05-02 ENCOUNTER — HOSPITAL ENCOUNTER (OUTPATIENT)
Dept: MAMMOGRAPHY | Age: 50
Discharge: HOME | End: 2025-05-02
Attending: INTERNAL MEDICINE
Payer: COMMERCIAL

## 2025-05-02 DIAGNOSIS — Z12.31 ENCOUNTER FOR SCREENING MAMMOGRAM FOR MALIGNANT NEOPLASM OF BREAST: ICD-10-CM

## 2025-05-02 PROCEDURE — 77063 BREAST TOMOSYNTHESIS BI: CPT | Performed by: INTERNAL MEDICINE

## 2025-05-02 PROCEDURE — 77067 SCR MAMMO BI INCL CAD: CPT | Performed by: INTERNAL MEDICINE

## 2025-05-28 ENCOUNTER — OFFICE VISIT (OUTPATIENT)
Dept: FAMILY MEDICINE CLINIC | Facility: CLINIC | Age: 50
End: 2025-05-28
Payer: COMMERCIAL

## 2025-05-28 VITALS
TEMPERATURE: 98 F | BODY MASS INDEX: 29.09 KG/M2 | HEIGHT: 63 IN | RESPIRATION RATE: 16 BRPM | DIASTOLIC BLOOD PRESSURE: 64 MMHG | SYSTOLIC BLOOD PRESSURE: 116 MMHG | WEIGHT: 164.19 LBS | OXYGEN SATURATION: 98 % | HEART RATE: 54 BPM

## 2025-05-28 DIAGNOSIS — J04.0 LARYNGITIS: ICD-10-CM

## 2025-05-28 DIAGNOSIS — J06.9 VIRAL URI: Primary | ICD-10-CM

## 2025-05-28 DIAGNOSIS — H92.01 OTALGIA, RIGHT: ICD-10-CM

## 2025-05-28 PROCEDURE — 99213 OFFICE O/P EST LOW 20 MIN: CPT | Performed by: NURSE PRACTITIONER

## 2025-05-28 NOTE — PROGRESS NOTES
CHIEF COMPLAINT:     Chief Complaint   Patient presents with    Cough     nasal congestion, ear pain and Laryngitis since Saturday        HPI:   Nelia Barahona is a 50 year old female presents to clinic with complaint of URI symptoms and right ear pain.  Onset 4 days.  C/o laryngitis, dry cough, minor sore throat, RT ear pain/pressure, PND.  Denies dyspnea, chest pain, sob, wheezing, ear drainage, GI complaints, or rashes.  Hasn't really had nasal discharge.  Taking ibuprofen.  No known ill contacts or travel, no recurrent AOM.    Current Medications[1]   Past Medical History[2]   Social History:  Short Social Hx on File[3]     REVIEW OF SYSTEMS:   GENERAL HEALTH:See HPI  SKIN: denies any unusual skin lesions or rashes  HEENT: denies ear pain or difficulty swallowing/eating. See HPI  RESPIRATORY: denies shortness of breath or wheezing  CARDIOVASCULAR: denies chest pain or palpitations   GI: denies vomiting or diarrhea  NEURO: denies dizziness or lightheadedness    EXAM:   /64   Pulse 54   Temp 98 °F (36.7 °C) (Oral)   Resp 16   Ht 5' 3\" (1.6 m)   Wt 164 lb 3.2 oz (74.5 kg)   LMP 01/08/2025 (Approximate)   SpO2 98%   BMI 29.09 kg/m²   GENERAL: well developed, well nourished, in no apparent distress  SKIN: no rashes, no suspicious lesions  HEAD: atraumatic, normocephalic  EYES: conjunctiva clear, EOM intact  EARS: TM's clear, non-injected, no bulging, retraction, or fluid bilaterally  NOSE: nostrils patent, no exudates, nasal mucosa pink and noninflamed  THROAT: oral mucosa pink, moist. Posterior pharynx +mildly erythematous. No exudates. Uvula midline.  +Laryngitis.  NECK: supple, non-tender  LUNGS: clear to auscultation bilaterally, no wheezes or rhonchi. Breathing is non labored. +Dry cough.  CARDIO: RRR without murmur  LYMPH: No lymphadenopathy.    No results found for this or any previous visit (from the past 24 hours).      ASSESSMENT AND PLAN:   Assessment:   Nelia was seen today for  cough.    Diagnoses and all orders for this visit:    Viral URI    Laryngitis    Otalgia, right          Plan:   - Hx/exam c/w viral illness.    - Reassured no ear infection at this time.  - Stressed importance of voice rest, increase fluids, humidified air, honey, warm liquids.  - Suggested Flonase, Sudafed.    - Discussed comfort measures which are explained and discussed as listed in Patient Instructions.  - Advised follow up within 5-7 days if not improving, condition worsens, or new/persistent fevers.  - Pt verbalizes understanding and is agreeable w/ plan.    There are no Patient Instructions on file for this visit.                  [1]   Current Outpatient Medications   Medication Sig Dispense Refill    ergocalciferol 1.25 MG (81034 UT) Oral Cap Take 1 capsule (50,000 Units total) by mouth once a week. 12 capsule 0    metFORMIN  MG Oral Tablet 24 Hr 1 tab in morning for 14 days, then 1 tab twice a day for 14 days, then 2 tab in morning and 1 tab in evening for 14 days, then 2 tab in morning and 2 tabs in evening (four tabs total) long term. (Patient not taking: Reported on 5/28/2025)      acetaminophen 500 MG Oral Tab Take 2 tablets (1,000 mg total) by mouth every 6 (six) hours as needed for Pain. (Patient not taking: Reported on 5/28/2025)      pantoprazole 40 MG Oral Tab EC Take 1 tablet (40 mg total) by mouth every morning before breakfast. (Patient not taking: Reported on 5/28/2025) 30 tablet 0   [2]   Past Medical History:   Acute sinusitis    Chronic RLQ pain    Constipation    Dermatitis    Exposure to genital herpes    Migraine    Migraines    Pelvic pain in female    SINCE FEB/ 2018   [3]   Social History  Socioeconomic History    Marital status: Single   Tobacco Use    Smoking status: Former    Smokeless tobacco: Never    Tobacco comments:     around 21y/o; only for 1yr about 1-2 cigarettes per day   Vaping Use    Vaping status: Never Used   Substance and Sexual Activity    Alcohol use: Yes      Alcohol/week: 3.0 standard drinks of alcohol     Types: 3 Standard drinks or equivalent per week     Comment: socially    Drug use: No    Sexual activity: Yes     Birth control/protection: Vasectomy   Other Topics Concern    Caffeine Concern Yes     Comment: coffee 3 cups    Pt has a pacemaker No    Pt has a defibrillator No    Reaction to local anesthetic No

## 2025-06-04 ENCOUNTER — OFFICE VISIT (OUTPATIENT)
Dept: INTERNAL MEDICINE CLINIC | Facility: CLINIC | Age: 50
End: 2025-06-04

## 2025-06-04 ENCOUNTER — NURSE TRIAGE (OUTPATIENT)
Dept: INTERNAL MEDICINE CLINIC | Facility: CLINIC | Age: 50
End: 2025-06-04

## 2025-06-04 VITALS
BODY MASS INDEX: 28.88 KG/M2 | WEIGHT: 163 LBS | HEART RATE: 52 BPM | SYSTOLIC BLOOD PRESSURE: 118 MMHG | DIASTOLIC BLOOD PRESSURE: 73 MMHG | HEIGHT: 63 IN

## 2025-06-04 DIAGNOSIS — J04.0 LARYNGITIS: ICD-10-CM

## 2025-06-04 DIAGNOSIS — N89.8 VAGINAL ITCHING: Primary | ICD-10-CM

## 2025-06-04 PROCEDURE — 99213 OFFICE O/P EST LOW 20 MIN: CPT | Performed by: INTERNAL MEDICINE

## 2025-06-04 RX ORDER — FLUCONAZOLE 150 MG/1
150 TABLET ORAL
Qty: 2 TABLET | Refills: 0 | Status: SHIPPED | OUTPATIENT
Start: 2025-06-04

## 2025-06-04 NOTE — TELEPHONE ENCOUNTER
Called and spoke with patient and identified full name and date of birth.  Relayed Dr. Regalado message and patient asking to be seen today for evaluation  She does not want to apply a cream  Appointment scheduled as below    Future Appointments   Date Time Provider Department Center   6/4/2025  6:20 PM Brissa Goldman MD [id number] [place]

## 2025-06-04 NOTE — PROGRESS NOTES
Nelia Barahona is a 50 year old female.  Chief Complaint   Patient presents with    Vaginal Problem     HPI:        The following individual(s) verbally consented to be recorded using ambient AI listening technology and understand that they can each withdraw their consent to this listening technology at any point by asking the clinician to turn off or pause the recording:    Patient name: Nelia Barahona    History of Present Illness  Nelia Barahona is a 50 year old female who presents with vaginal discharge and itching.    She has been experiencing vaginal discharge and itching for a few days, which she attributes to using her sister's laundry detergent instead of her usual 'Free and Clear' detergent. The discharge is described as white. She has a history of sensitivity to fragrances and typically uses fragrance-free products. She has not used any over-the-counter treatments due to poor results in the past, but typically uses Diflucan and a non-fragrant feminine wash.    She also reports having laryngitis since Labor Day weekend, which began with sneezing and irritation, followed by a loss of voice after exposure to cold air and heating. She did not work for a week due to the severity of her symptoms. Her voice has improved but still fluctuates, especially with changes in air temperature or increased talking. Her throat feels tighter when symptoms worsen.    No use of over-the-counter medications for her symptoms.            Current Medications[1]   Past Medical History[2]   Past Surgical History[3]   Social History:  Short Social Hx on File[4]   Family History[5]   Allergies[6]     REVIEW OF SYSTEMS:   Review of Systems   Review of Systems   Constitutional: Negative for activity change, appetite change and fever.   HENT: Negative for congestion and voice change.    Respiratory: Negative for cough and shortness of breath.    Cardiovascular: Negative for chest pain.   Gastrointestinal: Negative for abdominal distention,  abdominal pain and vomiting.   Genitourinary: Negative for hematuria.   Skin: Negative for wound.   Psychiatric/Behavioral: Negative for behavioral problems.   Wt Readings from Last 5 Encounters:   06/04/25 163 lb (73.9 kg)   05/28/25 164 lb 3.2 oz (74.5 kg)   04/16/25 162 lb (73.5 kg)   04/11/24 155 lb (70.3 kg)   02/07/24 155 lb (70.3 kg)     Body mass index is 28.87 kg/m².      EXAM:   /73   Pulse 52   Ht 5' 3\" (1.6 m)   Wt 163 lb (73.9 kg)   LMP 01/08/2025 (Approximate)   BMI 28.87 kg/m²   Physical Exam   Constitutional:       Appearance: Normal appearance.   HENT:      Head: Normocephalic.   Eyes:      Conjunctiva/sclera: Conjunctivae normal.   Breast:  Normal bilateral breast exam. No palpable masses or nodules.   No nipples asymmetry or discharge. No skin changes   Cardiovascular:      Rate and Rhythm: Normal rate and regular rhythm.      Heart sounds: Normal heart sounds. No murmur heard.  Pulmonary:      Effort: Pulmonary effort is normal.      Breath sounds: Normal breath sounds. No rhonchi or rales.   Abdominal:      General: Bowel sounds are normal.      Palpations: Abdomen is soft.      Tenderness: There is no abdominal tenderness.   Musculoskeletal:      Cervical back: Neck supple.      Right lower leg: No edema.      Left lower leg: No edema.   Skin:     General: Skin is warm and dry.   Neurological:      General: No focal deficit present.      Mental Status: He is alert and oriented to person, place, and time. Mental status is at baseline.   Psychiatric:         Mood and Affect: Mood normal.         Behavior: Behavior normal.       ASSESSMENT AND PLAN:         Assessment & Plan     Assessment & Plan  Vulvovaginal candidiasis  Reports vulvovaginal discharge, itching, and inflammation, likely triggered by a change in laundry detergent. Symptoms present for a few days. Suspected vulvovaginal candidiasis, possibly complicated by bacterial vaginosis. Decision to treat with Diflucan due to  sensitivity to over-the-counter treatments and previous successful use of Diflucan. Swab performed to confirm diagnosis and rule out bacterial vaginosis.  - Prescribe Diflucan 150 mg, one dose. Provide two pills; take the second dose after three days if symptoms persist.  - Perform vaginal swab to confirm diagnosis and rule out bacterial vaginosis.    Laryngitis  Experiencing laryngitis since Labor Day weekend, with symptoms fluctuating based on environmental factors such as air conditioning and heating. Condition improved, but voice remains affected. Minimal redness noted. Continue current management and allow time for recovery.  - Advise against steroid or antibiotic treatment.  - Continue current management and allow time for recovery.     The patient indicates understanding of these issues and agrees to the plan.      Brissa Goldman MD            [1]   Current Outpatient Medications   Medication Sig Dispense Refill    fluconazole (DIFLUCAN) 150 MG Oral Tab Take 1 tablet (150 mg total) by mouth every third day. 2 tablet 0    ergocalciferol 1.25 MG (82467 UT) Oral Cap Take 1 capsule (50,000 Units total) by mouth once a week. 12 capsule 0    metFORMIN  MG Oral Tablet 24 Hr 1 tab in morning for 14 days, then 1 tab twice a day for 14 days, then 2 tab in morning and 1 tab in evening for 14 days, then 2 tab in morning and 2 tabs in evening (four tabs total) long term. (Patient not taking: Reported on 6/4/2025)      acetaminophen 500 MG Oral Tab Take 2 tablets (1,000 mg total) by mouth every 6 (six) hours as needed for Pain. (Patient not taking: Reported on 6/4/2025)      pantoprazole 40 MG Oral Tab EC Take 1 tablet (40 mg total) by mouth every morning before breakfast. (Patient not taking: Reported on 6/4/2025) 30 tablet 0   [2]   Past Medical History:   Acute sinusitis    Chronic RLQ pain    Constipation    Dermatitis    Exposure to genital herpes    Migraine    Migraines    Pelvic pain in female    SINCE  FEB/ 2018   [3]   Past Surgical History:  Procedure Laterality Date    Laparoscopy,diagnostic     [4]   Social History  Socioeconomic History    Marital status: Single   Tobacco Use    Smoking status: Former    Smokeless tobacco: Never    Tobacco comments:     around 19y/o; only for 1yr about 1-2 cigarettes per day   Vaping Use    Vaping status: Never Used   Substance and Sexual Activity    Alcohol use: Yes     Alcohol/week: 3.0 standard drinks of alcohol     Types: 3 Standard drinks or equivalent per week     Comment: socially    Drug use: No    Sexual activity: Yes     Birth control/protection: Vasectomy   Other Topics Concern    Caffeine Concern Yes     Comment: coffee 3 cups    Pt has a pacemaker No    Pt has a defibrillator No    Reaction to local anesthetic No   [5]   Family History  Problem Relation Age of Onset    Hypertension Mother     Lipids Mother         hyperlipidemia    Other (Other) Mother     Cancer Cousin         leukemia    Cancer Paternal Uncle         unsure    Stroke Maternal Grandmother     Stroke Maternal Grandfather     No Known Problems Father     No Known Problems Daughter     No Known Problems Son     Obesity Sister     Other (Other) Brother         divirtuculitis   [6]   Allergies  Allergen Reactions    Latex RASH

## 2025-06-04 NOTE — TELEPHONE ENCOUNTER
Action Requested: Summary for Provider     []  Critical Lab, Recommendations Needed  [] Need Additional Advice  []   FYI    []   Need Orders  [x] Need Medications Sent to Pharmacy  []  Other     SUMMARY: Recommended visit today, patient declined, does feel this is a yeast infection and is requesting medication to be sent to her pharmacy.     Verified preferred pharmacy and patient allergies.     Reason for call: Yeast Infection  Onset: 5 days     Patient called in reporting yeast infection symptoms x5 days, symptoms include: dryness and irritation. Patient states she has skin breakdown and pain. Denies any discharge or odor. Patient has been using ibuprofem for the pain, trying to keep area clean and dry as possible. Has not taken oevr the counter yeast medications.     Sent the following [employer] message     I have been experiencing yeast symptoms since Friday. I had mistakenly used a scented detergent. The dryness and irritation has increased quickly. Please send send me a Rx for Diflucan to my [facility] pharmacy on [address] [facility] [address]. I would really appreciate it. Thank you   Reason for Disposition   MILD-MODERATE pain and present > 24 hours  (Exception: Chronic pain.)    Protocols used: Vaginal Symptoms-A-OH

## 2025-06-05 LAB
BV BACTERIA DNA VAG QL NAA+PROBE: NEGATIVE
C GLABRATA DNA VAG QL NAA+PROBE: NEGATIVE
C KRUSEI DNA VAG QL NAA+PROBE: NEGATIVE
CANDIDA DNA VAG QL NAA+PROBE: POSITIVE
T VAGINALIS DNA VAG QL NAA+PROBE: NEGATIVE

## 2025-07-16 RX ORDER — ERGOCALCIFEROL 1.25 MG/1
50000 CAPSULE, LIQUID FILLED ORAL WEEKLY
Qty: 12 CAPSULE | Refills: 0 | Status: SHIPPED | OUTPATIENT
Start: 2025-07-16

## 2025-07-16 NOTE — TELEPHONE ENCOUNTER
Please Review. Protocol Failed; No Protocol     Requested Prescriptions   Pending Prescriptions Disp Refills    ERGOCALCIFEROL 1.25 MG (77662 UT) Oral Cap [Pharmacy Med Name: VITAMIN D2 1.25MG(50,000 UNIT)] 12 capsule 0     Sig: TAKE 1 CAPSULE BY MOUTH ONE TIME PER WEEK       There is no refill protocol information for this order

## (undated) DIAGNOSIS — Z20.822 EXPOSURE TO COVID-19 VIRUS: Primary | ICD-10-CM

## (undated) NOTE — LETTER
AUTHORIZATION FOR SURGICAL OPERATION OR OTHER PROCEDURE    1.  I hereby authorize  and [facility] staff assigned to my case to perform the following operation and/or procedure at the [facility]:      ENDOMETRIAL BIOPSY    2.  My physic Relationship to Patient:           []  Parent    Responsible person                          []  Spouse  In case of minor or                    [] Other  _____________   Incompetent name:  __________________________________________________

## (undated) NOTE — LETTER
[address]  [address] [facility]  [address] [facility] [address]  [phone]     Patient: Maryam Hussein   YOB: 1975   Date of Visit: 2/19/2021     Dear Employer,        February 19, 2021    At [facility], we are taking specia Persons infected with SARS-CoV-2 who never develop COVID-19 symptoms may discontinue isolation and other precautions 10 days after the date of their first positive RT-PCR test for SARS-CoV-2 RNA.     Persons who are asymptomatic but have been exposed, CDC r

## (undated) NOTE — LETTER
Date & Time: 7/24/2019, 9:51 PM  Patient: Max Alford  Encounter Provider(s):    Sender, Pratik Martínez MD       To Whom It May Concern:    Max Alford was seen and treated in our department on 7/24/2019. She should not return to work until 7/29/2019.     If yo

## (undated) NOTE — LETTER
6/21/2018              Floria Klinefelter [address]         Dear Michael Hood,      It was a pleasure to see you at our [facility] [place], [place] office.   Neg pap, HPV neg, repeat pap needed in 3

## (undated) NOTE — LETTER
AUTHORIZATION FOR SURGICAL OPERATION OR OTHER PROCEDURE    1. I hereby authorize Leann Garvey, and [facility] staff assigned to my case to perform the following operation and/or procedure at the [facility]:    Endometrial biopsy    2.   My physici Relationship to Patient:           []  Parent    Responsible person                          []  Spouse  In case of minor or                    [] Other  _____________   Incompetent name:  __________________________________________________

## (undated) NOTE — ED AVS SNAPSHOT
Stella Faustin   MRN: [id number]    Department:  [facility] Emergency Department   Date of Visit:  9/26/2017           Disclosure     Insurance plans vary and the physician(s) referred by the ER may not be covered by your plan.  Please contact you CARE PHYSICIAN AT ONCE OR RETURN IMMEDIATELY TO THE EMERGENCY DEPARTMENT. If you have been prescribed any medication(s), please fill your prescription right away and begin taking the medication(s) as directed.   If you believe that any of the medications

## (undated) NOTE — ED AVS SNAPSHOT
Juliet Ibarra   MRN: [id number]    Department:  [facility] Emergency Department   Date of Visit:  1/15/2020           Disclosure     Insurance plans vary and the physician(s) referred by the ER may not be covered by your plan.  Please contact you CARE PHYSICIAN AT ONCE OR RETURN IMMEDIATELY TO THE EMERGENCY DEPARTMENT. If you have been prescribed any medication(s), please fill your prescription right away and begin taking the medication(s) as directed.   If you believe that any of the medications

## (undated) NOTE — ED AVS SNAPSHOT
Jose J Barnes   MRN: [id number]    Department:  [facility] Emergency Department   Date of Visit:  7/24/2019           Disclosure     Insurance plans vary and the physician(s) referred by the ER may not be covered by your plan.  Please contact you CARE PHYSICIAN AT ONCE OR RETURN IMMEDIATELY TO THE EMERGENCY DEPARTMENT. If you have been prescribed any medication(s), please fill your prescription right away and begin taking the medication(s) as directed.   If you believe that any of the medications

## (undated) NOTE — MR AVS SNAPSHOT
After Visit Summary   4/11/2024    Nelia Barahona   MRN: [id number]           Visit Information     Date & Time  4/11/2024  2:30 PM Provider  Dinora Dalton PA-C [facility] [place], [facility] - OB/GYN Dept. Phone  [phone]      Your Vitals Were  Most recent update: 4/11/2024  2:51 PM    Ht   63\"    Wt   155 lb    LMP   02/28/2024 (Approximate)    BMI   27.46 kg/m²          Allergies as of 4/11/2024  Review status set to Review Complete on 4/11/2024       Noted Reaction Type Reactions    Latex 07/17/2014    RASH      Your Current Medications        Dosage    metFORMIN  MG Oral Tablet 24 Hr 1 tab in morning for 14 days, then 1 tab twice a day for 14 days, then 2 tab in morning and 1 tab in evening for 14 days, then 2 tab in morning and 2 tabs in evening (four tabs total) long term.    acetaminophen 500 MG Oral Tab Take 2 tablets (1,000 mg total) by mouth every 6 (six) hours as needed for Pain.    ergocalciferol 1.25 MG (94744 UT) Oral Cap Take 1 capsule (50,000 Units total) by mouth once a week.    gabapentin 100 MG Oral Cap Take 3 capsules (300 mg total) by mouth 3 (three) times daily.    pantoprazole 40 MG Oral Tab EC Take 1 tablet (40 mg total) by mouth every morning before breakfast.      Diagnoses for This Visit    Screening for cervical cancer   [844476]  -  Primary  Women's annual routine gynecological examination   [6534905]    Screen for STD (sexually transmitted disease)   [539570]             We Ordered the Following     Normal Orders This Visit    Chlamydia/Gc Amplification [2955915 CUSTOM]     Hpv Dna  High Risk , Thin Prep Collect [[id number] CUSTOM]     THINPREP PAP SMEAR ONLY [[id number] CUSTOM]     ThinPrep PAP Smear [[id number] CUSTOM]     Future Labs/Procedures Expected by Expires    Chlamydia/Gc Amplification [[id number] CUSTOM]  4/11/2024 (Approximate) 4/11/2025    HCV Antibody [[id number] CUSTOM]  4/11/2024 (Approximate) 4/11/2025    Hepatitis  B Surface Antigen [[id number] CUSTOM]  4/11/2024 (Approximate) 4/11/2025    HIV Ag/Ab Combo [[id number] CUSTOM]  4/11/2024 (Approximate) 4/11/2025    Hpv Dna  High Risk , Thin Prep Collect [[id number] CUSTOM]  4/11/2024 4/11/2025    T Pallidum Screening Bailey [1372510 CPT(R)]  4/11/2024 (Approximate) 4/11/2025    ThinPrep PAP Smear [[id number] CUSTOM]  4/11/2024 4/11/2025                Did you know that [facility] primary care physicians now offer Video Visits through [employer] for adult patients for a variety of conditions such as allergies, back pain and cold symptoms? Skip the drive and waiting room and online chat with a doctor face-to-face using your web-cam enabled computer or mobile device wherever you are. Video Visits cost $50 and can be paid hassle-free using a credit, debit, or health savings card.  Not active on [employer]? Ask us how to get signed up today!          If you receive a survey from [employer], please take a few minutes to complete it and provide feedback. We strive to deliver the best patient experience and are looking for ways to make improvements. Your feedback will help us do so. For more information on [employer], please visit [email or web address]           No text in SmartText           No text in SmartText

## (undated) NOTE — LETTER
9/6/2019              [facility]        [address]         Dear Juanito Cuevas,    It was a pleasure to see you. Mammogram is normal, repeat in 1 year. There is no need for further testing at this time.   I look forward to seeing

## (undated) NOTE — LETTER
Date: 5/28/2025    Patient Name: Nelia Barahona          To Whom it may concern:    This letter has been written at the patient's request. The above patient was seen today at [facility] for treatment of a medical condition.    Please excuse patient from work 5/28/25 and 5/29/25.        Sincerely,    GIORGI Steiner  Family Nurse Practitioner  [facility]

## (undated) NOTE — LETTER
Date & Time: 7/24/2019, 9:51 PM  Patient: Floria Klinefelter  Encounter Provider(s):    Sender, Nancy Jennings MD       To Whom It May Concern:    Floria Klinefelter was seen and treated in our department on 7/24/2019. She {Return to school/sport/work:[id number]}.     If you

## (undated) NOTE — LETTER
10/28/20          Cheryl Fermin  :  1975      To Whom It May Concern: This patient was evaluated 10/28/20 .   Work status:  Remain off work, temporary total disability    Per patient approval, she is currently awaiting testing and results of